# Patient Record
Sex: MALE | Race: WHITE | URBAN - NONMETROPOLITAN AREA
[De-identification: names, ages, dates, MRNs, and addresses within clinical notes are randomized per-mention and may not be internally consistent; named-entity substitution may affect disease eponyms.]

---

## 2022-05-03 ENCOUNTER — HISTORICAL (OUTPATIENT)
Dept: ADMINISTRATIVE | Facility: HOSPITAL | Age: 62
End: 2022-05-03

## 2024-06-07 ENCOUNTER — HOSPITAL ENCOUNTER (INPATIENT)
Facility: HOSPITAL | Age: 64
LOS: 1 days | Discharge: HOME | DRG: 069 | End: 2024-06-08
Attending: SURGERY | Admitting: INTERNAL MEDICINE
Payer: COMMERCIAL

## 2024-06-07 DIAGNOSIS — R53.81 MALAISE: ICD-10-CM

## 2024-06-07 DIAGNOSIS — R07.9 CHEST PAIN: ICD-10-CM

## 2024-06-07 DIAGNOSIS — D69.6 THROMBOCYTOPENIA: ICD-10-CM

## 2024-06-07 DIAGNOSIS — I25.10 CORONARY ARTERY DISEASE INVOLVING NATIVE CORONARY ARTERY OF NATIVE HEART WITHOUT ANGINA PECTORIS: ICD-10-CM

## 2024-06-07 DIAGNOSIS — N39.0 URINARY TRACT INFECTION WITHOUT HEMATURIA, SITE UNSPECIFIED: ICD-10-CM

## 2024-06-07 DIAGNOSIS — N28.9 ACUTE RENAL INSUFFICIENCY: ICD-10-CM

## 2024-06-07 DIAGNOSIS — I63.9 CEREBROVASCULAR ACCIDENT (CVA), UNSPECIFIED MECHANISM: Primary | ICD-10-CM

## 2024-06-07 PROBLEM — Z79.01 CHRONIC ANTICOAGULATION: Status: ACTIVE | Noted: 2024-06-07

## 2024-06-07 PROBLEM — N30.00 ACUTE CYSTITIS WITHOUT HEMATURIA: Status: ACTIVE | Noted: 2024-06-07

## 2024-06-07 PROBLEM — R47.1 DYSARTHRIA: Status: ACTIVE | Noted: 2024-06-07

## 2024-06-07 LAB
ALBUMIN SERPL-MCNC: 4.2 G/DL (ref 3.4–5)
ALBUMIN/GLOB SERPL: 1.3 RATIO
ALP SERPL-CCNC: 167 UNIT/L (ref 50–144)
ALT SERPL-CCNC: 49 UNIT/L (ref 1–45)
AMPHET UR QL SCN: NEGATIVE
ANION GAP SERPL CALC-SCNC: 8 MEQ/L (ref 2–13)
AST SERPL-CCNC: 51 UNIT/L (ref 17–59)
BACTERIA #/AREA URNS AUTO: ABNORMAL /HPF
BARBITURATE SCN PRESENT UR: NEGATIVE
BASOPHILS # BLD AUTO: 0.01 X10(3)/MCL (ref 0.01–0.08)
BASOPHILS NFR BLD AUTO: 0.1 % (ref 0.1–1.2)
BENZODIAZ UR QL SCN: NEGATIVE
BILIRUB SERPL-MCNC: 1.1 MG/DL (ref 0–1)
BILIRUB UR QL STRIP.AUTO: NEGATIVE
BUN SERPL-MCNC: 15 MG/DL (ref 7–20)
CALCIUM SERPL-MCNC: 9.5 MG/DL (ref 8.4–10.2)
CANNABINOIDS UR QL SCN: NEGATIVE
CHLORIDE SERPL-SCNC: 101 MMOL/L (ref 98–110)
CLARITY UR: CLEAR
CO2 SERPL-SCNC: 25 MMOL/L (ref 21–32)
COCAINE UR QL SCN: NEGATIVE
COLOR UR AUTO: YELLOW
CREAT SERPL-MCNC: 1.54 MG/DL (ref 0.66–1.25)
CREAT/UREA NIT SERPL: 10 (ref 12–20)
EOSINOPHIL # BLD AUTO: 0.08 X10(3)/MCL (ref 0.04–0.54)
EOSINOPHIL NFR BLD AUTO: 1.1 % (ref 0.7–7)
ERYTHROCYTE [DISTWIDTH] IN BLOOD BY AUTOMATED COUNT: 12.2 %
ETHANOL BLD-MCNC: <0.01 G/DL
ETHANOL SERPL-MCNC: <10 MG/DL
GFR SERPLBLD CREATININE-BSD FMLA CKD-EPI: 49 ML/MIN/1.73/M2
GLOBULIN SER-MCNC: 3.2 GM/DL (ref 2–3.9)
GLUCOSE SERPL-MCNC: 108 MG/DL (ref 70–115)
GLUCOSE UR QL STRIP: NEGATIVE
HCT VFR BLD AUTO: 41.6 % (ref 36–52)
HGB BLD-MCNC: 14.9 G/DL (ref 13–18)
HGB UR QL STRIP: NEGATIVE
IMM GRANULOCYTES # BLD AUTO: 0.02 X10(3)/MCL (ref 0–0.03)
IMM GRANULOCYTES NFR BLD AUTO: 0.3 % (ref 0–0.5)
KETONES UR QL STRIP: NEGATIVE
LEUKOCYTE ESTERASE UR QL STRIP: ABNORMAL
LYMPHOCYTES # BLD AUTO: 0.86 X10(3)/MCL (ref 1.32–3.57)
LYMPHOCYTES NFR BLD AUTO: 11.6 % (ref 20–55)
MCH RBC QN AUTO: 30.8 PG (ref 27–34)
MCHC RBC AUTO-ENTMCNC: 35.8 G/DL (ref 31–37)
MCV RBC AUTO: 86.1 FL (ref 79–99)
METHADONE UR QL SCN: NEGATIVE
MONOCYTES # BLD AUTO: 0.65 X10(3)/MCL (ref 0.3–0.82)
MONOCYTES NFR BLD AUTO: 8.8 % (ref 4.7–12.5)
NEUTROPHILS # BLD AUTO: 5.79 X10(3)/MCL (ref 1.78–5.38)
NEUTROPHILS NFR BLD AUTO: 78.1 % (ref 37–73)
NITRITE UR QL STRIP: NEGATIVE
OPIATES UR QL SCN: NEGATIVE
PCP UR QL: NEGATIVE
PH UR STRIP: 6 [PH]
PH UR: 6 [PH] (ref 5–8)
PLATELET # BLD AUTO: 123 X10(3)/MCL (ref 140–371)
PMV BLD AUTO: 8.2 FL (ref 9.4–12.4)
POTASSIUM SERPL-SCNC: 4 MMOL/L (ref 3.5–5.1)
PROT SERPL-MCNC: 7.4 GM/DL (ref 6.3–8.2)
PROT UR QL STRIP: NEGATIVE
RBC # BLD AUTO: 4.83 X10(6)/MCL (ref 4–6)
RBC #/AREA URNS AUTO: ABNORMAL /HPF
SODIUM SERPL-SCNC: 134 MMOL/L (ref 136–145)
SP GR UR STRIP.AUTO: 1.01 (ref 1–1.03)
SQUAMOUS #/AREA URNS AUTO: ABNORMAL /HPF
UROBILINOGEN UR STRIP-ACNC: 0.2
WBC # SPEC AUTO: 7.41 X10(3)/MCL (ref 4–11.5)
WBC #/AREA URNS AUTO: ABNORMAL /HPF

## 2024-06-07 PROCEDURE — 99204 OFFICE O/P NEW MOD 45 MIN: CPT | Mod: GT,,, | Performed by: PSYCHIATRY & NEUROLOGY

## 2024-06-07 PROCEDURE — 80307 DRUG TEST PRSMV CHEM ANLYZR: CPT | Performed by: SURGERY

## 2024-06-07 PROCEDURE — 96360 HYDRATION IV INFUSION INIT: CPT

## 2024-06-07 PROCEDURE — 84484 ASSAY OF TROPONIN QUANT: CPT | Performed by: SURGERY

## 2024-06-07 PROCEDURE — 93010 ELECTROCARDIOGRAM REPORT: CPT | Mod: ,,, | Performed by: INTERNAL MEDICINE

## 2024-06-07 PROCEDURE — 93005 ELECTROCARDIOGRAM TRACING: CPT

## 2024-06-07 PROCEDURE — 81015 MICROSCOPIC EXAM OF URINE: CPT | Performed by: SURGERY

## 2024-06-07 PROCEDURE — 85025 COMPLETE CBC W/AUTO DIFF WBC: CPT | Performed by: SURGERY

## 2024-06-07 PROCEDURE — 81003 URINALYSIS AUTO W/O SCOPE: CPT | Performed by: SURGERY

## 2024-06-07 PROCEDURE — 25000003 PHARM REV CODE 250: Performed by: SURGERY

## 2024-06-07 PROCEDURE — 11000001 HC ACUTE MED/SURG PRIVATE ROOM

## 2024-06-07 PROCEDURE — 87086 URINE CULTURE/COLONY COUNT: CPT | Performed by: SURGERY

## 2024-06-07 PROCEDURE — 82077 ASSAY SPEC XCP UR&BREATH IA: CPT | Performed by: SURGERY

## 2024-06-07 PROCEDURE — 80053 COMPREHEN METABOLIC PANEL: CPT | Performed by: SURGERY

## 2024-06-07 PROCEDURE — 99285 EMERGENCY DEPT VISIT HI MDM: CPT | Mod: 25

## 2024-06-07 PROCEDURE — 87077 CULTURE AEROBIC IDENTIFY: CPT | Performed by: SURGERY

## 2024-06-07 RX ORDER — ENOXAPARIN SODIUM 100 MG/ML
1 INJECTION SUBCUTANEOUS EVERY 12 HOURS
Status: DISCONTINUED | OUTPATIENT
Start: 2024-06-07 | End: 2024-06-08

## 2024-06-07 RX ORDER — DEXTROSE, SODIUM CHLORIDE, SODIUM LACTATE, POTASSIUM CHLORIDE, AND CALCIUM CHLORIDE 5; .6; .31; .03; .02 G/100ML; G/100ML; G/100ML; G/100ML; G/100ML
INJECTION, SOLUTION INTRAVENOUS CONTINUOUS
Status: DISCONTINUED | OUTPATIENT
Start: 2024-06-07 | End: 2024-06-08 | Stop reason: HOSPADM

## 2024-06-07 RX ORDER — IBUPROFEN 200 MG
16 TABLET ORAL
Status: DISCONTINUED | OUTPATIENT
Start: 2024-06-07 | End: 2024-06-08 | Stop reason: HOSPADM

## 2024-06-07 RX ORDER — LEVOFLOXACIN 500 MG/1
500 TABLET, FILM COATED ORAL ONCE
Status: COMPLETED | OUTPATIENT
Start: 2024-06-07 | End: 2024-06-07

## 2024-06-07 RX ORDER — GLUCAGON 1 MG
1 KIT INJECTION
Status: DISCONTINUED | OUTPATIENT
Start: 2024-06-07 | End: 2024-06-08 | Stop reason: HOSPADM

## 2024-06-07 RX ORDER — SODIUM CHLORIDE 0.9 % (FLUSH) 0.9 %
10 SYRINGE (ML) INJECTION EVERY 12 HOURS PRN
Status: DISCONTINUED | OUTPATIENT
Start: 2024-06-07 | End: 2024-06-08 | Stop reason: HOSPADM

## 2024-06-07 RX ORDER — SIMETHICONE 80 MG
1 TABLET,CHEWABLE ORAL 4 TIMES DAILY PRN
Status: DISCONTINUED | OUTPATIENT
Start: 2024-06-07 | End: 2024-06-08 | Stop reason: HOSPADM

## 2024-06-07 RX ORDER — ONDANSETRON HYDROCHLORIDE 2 MG/ML
4 INJECTION, SOLUTION INTRAVENOUS EVERY 8 HOURS PRN
Status: DISCONTINUED | OUTPATIENT
Start: 2024-06-07 | End: 2024-06-08 | Stop reason: HOSPADM

## 2024-06-07 RX ORDER — ACETAMINOPHEN 325 MG/1
650 TABLET ORAL EVERY 4 HOURS PRN
Status: DISCONTINUED | OUTPATIENT
Start: 2024-06-07 | End: 2024-06-08 | Stop reason: HOSPADM

## 2024-06-07 RX ORDER — NALOXONE HCL 0.4 MG/ML
0.02 VIAL (ML) INJECTION
Status: DISCONTINUED | OUTPATIENT
Start: 2024-06-07 | End: 2024-06-08 | Stop reason: HOSPADM

## 2024-06-07 RX ORDER — IBUPROFEN 200 MG
24 TABLET ORAL
Status: DISCONTINUED | OUTPATIENT
Start: 2024-06-07 | End: 2024-06-08 | Stop reason: HOSPADM

## 2024-06-07 RX ORDER — SODIUM CHLORIDE 9 MG/ML
INJECTION, SOLUTION INTRAVENOUS CONTINUOUS
Status: DISCONTINUED | OUTPATIENT
Start: 2024-06-07 | End: 2024-06-07

## 2024-06-07 RX ORDER — LEVOFLOXACIN 500 MG/1
500 TABLET, FILM COATED ORAL DAILY
Status: DISCONTINUED | OUTPATIENT
Start: 2024-06-08 | End: 2024-06-08 | Stop reason: HOSPADM

## 2024-06-07 RX ORDER — ASPIRIN 325 MG
325 TABLET ORAL DAILY
Status: DISCONTINUED | OUTPATIENT
Start: 2024-06-08 | End: 2024-06-08 | Stop reason: HOSPADM

## 2024-06-07 RX ADMIN — SODIUM CHLORIDE 1000 ML: 9 INJECTION, SOLUTION INTRAVENOUS at 09:06

## 2024-06-07 RX ADMIN — LEVOFLOXACIN 500 MG: 500 TABLET, FILM COATED ORAL at 09:06

## 2024-06-08 VITALS
RESPIRATION RATE: 18 BRPM | TEMPERATURE: 98 F | OXYGEN SATURATION: 96 % | BODY MASS INDEX: 27.33 KG/M2 | WEIGHT: 184.5 LBS | HEART RATE: 69 BPM | SYSTOLIC BLOOD PRESSURE: 127 MMHG | HEIGHT: 69 IN | DIASTOLIC BLOOD PRESSURE: 77 MMHG

## 2024-06-08 LAB
ANION GAP SERPL CALC-SCNC: 5 MEQ/L (ref 2–13)
BASOPHILS # BLD AUTO: 0.02 X10(3)/MCL (ref 0.01–0.08)
BASOPHILS NFR BLD AUTO: 0.3 % (ref 0.1–1.2)
BUN SERPL-MCNC: 14 MG/DL (ref 7–20)
CALCIUM SERPL-MCNC: 8.7 MG/DL (ref 8.4–10.2)
CHLORIDE SERPL-SCNC: 106 MMOL/L (ref 98–110)
CO2 SERPL-SCNC: 25 MMOL/L (ref 21–32)
CREAT SERPL-MCNC: 1.26 MG/DL (ref 0.66–1.25)
CREAT/UREA NIT SERPL: 11 (ref 12–20)
EOSINOPHIL # BLD AUTO: 0.06 X10(3)/MCL (ref 0.04–0.54)
EOSINOPHIL NFR BLD AUTO: 0.9 % (ref 0.7–7)
ERYTHROCYTE [DISTWIDTH] IN BLOOD BY AUTOMATED COUNT: 12.3 %
GFR SERPLBLD CREATININE-BSD FMLA CKD-EPI: 63 ML/MIN/1.73/M2
GLUCOSE SERPL-MCNC: 123 MG/DL (ref 70–115)
HCT VFR BLD AUTO: 38.1 % (ref 36–52)
HGB BLD-MCNC: 14 G/DL (ref 13–18)
IMM GRANULOCYTES # BLD AUTO: 0.02 X10(3)/MCL (ref 0–0.03)
IMM GRANULOCYTES NFR BLD AUTO: 0.3 % (ref 0–0.5)
LYMPHOCYTES # BLD AUTO: 0.86 X10(3)/MCL (ref 1.32–3.57)
LYMPHOCYTES NFR BLD AUTO: 13.6 % (ref 20–55)
MAGNESIUM SERPL-MCNC: 2.2 MG/DL (ref 1.8–2.4)
MCH RBC QN AUTO: 32.3 PG (ref 27–34)
MCHC RBC AUTO-ENTMCNC: 36.7 G/DL (ref 31–37)
MCV RBC AUTO: 87.8 FL (ref 79–99)
MONOCYTES # BLD AUTO: 0.55 X10(3)/MCL (ref 0.3–0.82)
MONOCYTES NFR BLD AUTO: 8.7 % (ref 4.7–12.5)
NEUTROPHILS # BLD AUTO: 4.82 X10(3)/MCL (ref 1.78–5.38)
NEUTROPHILS NFR BLD AUTO: 76.2 % (ref 37–73)
NRBC BLD AUTO-RTO: 0 %
PLATELET # BLD AUTO: 101 X10(3)/MCL (ref 140–371)
PMV BLD AUTO: 8.4 FL (ref 9.4–12.4)
POTASSIUM SERPL-SCNC: 4 MMOL/L (ref 3.5–5.1)
RBC # BLD AUTO: 4.34 X10(6)/MCL (ref 4–6)
SODIUM SERPL-SCNC: 136 MMOL/L (ref 136–145)
TROPONIN I SERPL-MCNC: <0.012 NG/ML (ref 0–0.03)
TROPONIN I SERPL-MCNC: <0.012 NG/ML (ref 0–0.03)
WBC # SPEC AUTO: 6.33 X10(3)/MCL (ref 4–11.5)

## 2024-06-08 PROCEDURE — 63600175 PHARM REV CODE 636 W HCPCS: Performed by: INTERNAL MEDICINE

## 2024-06-08 PROCEDURE — 84484 ASSAY OF TROPONIN QUANT: CPT | Performed by: SURGERY

## 2024-06-08 PROCEDURE — 85025 COMPLETE CBC W/AUTO DIFF WBC: CPT | Performed by: INTERNAL MEDICINE

## 2024-06-08 PROCEDURE — 83735 ASSAY OF MAGNESIUM: CPT | Performed by: INTERNAL MEDICINE

## 2024-06-08 PROCEDURE — 25000003 PHARM REV CODE 250: Performed by: INTERNAL MEDICINE

## 2024-06-08 PROCEDURE — 36415 COLL VENOUS BLD VENIPUNCTURE: CPT | Performed by: INTERNAL MEDICINE

## 2024-06-08 PROCEDURE — 80048 BASIC METABOLIC PNL TOTAL CA: CPT | Performed by: INTERNAL MEDICINE

## 2024-06-08 PROCEDURE — 25000003 PHARM REV CODE 250: Performed by: SURGERY

## 2024-06-08 RX ORDER — ATORVASTATIN CALCIUM 40 MG/1
40 TABLET, FILM COATED ORAL DAILY
Qty: 90 TABLET | Refills: 3 | Status: SHIPPED | OUTPATIENT
Start: 2024-06-08 | End: 2024-06-28 | Stop reason: SDUPTHER

## 2024-06-08 RX ORDER — LEVOFLOXACIN 500 MG/1
500 TABLET, FILM COATED ORAL DAILY
Qty: 5 TABLET | Refills: 0 | Status: SHIPPED | OUTPATIENT
Start: 2024-06-08 | End: 2024-06-14

## 2024-06-08 RX ADMIN — SODIUM CHLORIDE, SODIUM LACTATE, POTASSIUM CHLORIDE, CALCIUM CHLORIDE AND DEXTROSE MONOHYDRATE: 5; 600; 310; 30; 20 INJECTION, SOLUTION INTRAVENOUS at 10:06

## 2024-06-08 RX ADMIN — ENOXAPARIN SODIUM 80 MG: 80 INJECTION SUBCUTANEOUS at 08:06

## 2024-06-08 RX ADMIN — SODIUM CHLORIDE, SODIUM LACTATE, POTASSIUM CHLORIDE, CALCIUM CHLORIDE AND DEXTROSE MONOHYDRATE: 5; 600; 310; 30; 20 INJECTION, SOLUTION INTRAVENOUS at 12:06

## 2024-06-08 RX ADMIN — ASPIRIN 325 MG ORAL TABLET 325 MG: 325 PILL ORAL at 08:06

## 2024-06-08 RX ADMIN — LEVOFLOXACIN 500 MG: 500 TABLET, FILM COATED ORAL at 08:06

## 2024-06-08 RX ADMIN — ENOXAPARIN SODIUM 80 MG: 80 INJECTION SUBCUTANEOUS at 12:06

## 2024-06-08 NOTE — HPI
===========================================  Chief complaint: Dysarthria, difficulty walking  Allergies: Sulfa  CODE STATUS: Full Code     History:  Patient is a 64-year-old man with history of coronary artery disease status post CABG, chronic anticoagulation, hypercoagulable blood disorder, hypertension, daily alcohol use without abuse who had arrived at the emergency room with complaints of dysarthria and difficulty walking.       For the past few days patient has been having difficulty with a bronchitis and had recently been placed on medication.  He also admits to having recent sick contacts in which he was going to the  of his ex-wife.     Today prior to taking the promethazine medication patient was starting to have difficulty walking and stepping down from the sidewalk.  Patient was also having issues with dysarthria.  This was according to the niece.  Patient then had taken the promethazine to help with issues of her cough which led him to have worsening dysarthria and visual hallucinations as he was talking to his dad ex-wife.  This had been witnessed by family.     While patient does drink 2x32 ounce cans of beer daily patient has not had alcohol for the past 3 days due to not feeling well from the bronchitis.  Patient and family deny any history of withdrawals.        Patient has a hypercoagulable blood disorder in which he has been on anticoagulation/Coumadin chronically.  This has been proven in the past in which patient has had multiple pulmonary emboli and is to be on anticoagulation for life.  Patient is supposed to be taking Coumadin but has admitted that he has not been taking it for a while.       During interview patient continued to have issues of persistent dysarthria but there was no evidence of facial asymmetry, no evidence of expressive or receptive aphasia.  Patient was moving all limbs.       Initial vital signs in the emergency room 98.4 °F, 96 bpm, 20 respirations a minute,  133/85 mmHg, 95% on room air.  Patient was 84.8 kg.     Review of hematology is unremarkable and noncontributory.  Sodium was 134, potassium 4.0, chloride 101, carbon dioxide 25, BUNs/creatinine 15/1.54 with a glucose of 108.  Calcium is 9.5.  Alkaline phosphatase 167.  Total protein/albumin 7.4/4.2.  T. bili of 1.1.  AST/ALT 51/49.  Troponin 1 less than 0.012.  Alcohol level was within normal limits.  Urine drug screen was negative.  Urinary analysis shows patient to have evidence of urinary tract infection.       CT of the head without contrast reveals no acute intracranial abnormality.     Patient was admitted to the hospital for concerns of CVA.  NIH score in the emergency room was 1.

## 2024-06-08 NOTE — PLAN OF CARE
Problem: Adult Inpatient Plan of Care  Goal: Plan of Care Review  Outcome: Met  Goal: Patient-Specific Goal (Individualized)  Outcome: Met  Goal: Absence of Hospital-Acquired Illness or Injury  Outcome: Met  Goal: Optimal Comfort and Wellbeing  Outcome: Met  Goal: Readiness for Transition of Care  Outcome: Met     Problem: Fall Injury Risk  Goal: Absence of Fall and Fall-Related Injury  Outcome: Met     Problem: Fatigue  Goal: Improved Activity Tolerance  Outcome: Met     Problem: Infection  Goal: Absence of Infection Signs and Symptoms  Outcome: Met

## 2024-06-08 NOTE — ED PROVIDER NOTES
Encounter Date: 6/7/2024       History     Chief Complaint   Patient presents with    Fatigue     PT to ER A per KENNY, medic states PT is unsteady on feet and sleepy. PT was dx with bronchitis this am and been taking his promethazine.      63 YO WM ARRIVING VIA EMS W/ C/O UNSTEADY GAIT & DYSARTHRIA FOLLOWING PROMETHAZINE & UNSPECIFIED COUGH SYRUP PROVIDED AT URGENT CARE CENTER THIS AM FOR COUGH NOS.  NO CP.  +DAUGHTER MARKEDLY CONCERNED.  PATIENT DENIES CP/CHANGE VISUAL ACUITY OR AUDITORY ACUITY.  NO TINNITUS.  NO MOTOR OR SENSORY DEFICITS.  +Hx/o CABG NOS.        Review of patient's allergies indicates:   Allergen Reactions    Sulfa (sulfonamide antibiotics)      Past Medical History:   Diagnosis Date    History of open heart surgery      History reviewed. No pertinent surgical history.  No family history on file.  Social History     Substance Use Topics    Alcohol use: Yes     Review of Systems   Reason unable to perform ROS: DROWSY.   All other systems reviewed and are negative.      Physical Exam     Initial Vitals [06/07/24 1902]   BP Pulse Resp Temp SpO2   133/85 96 20 98.4 °F (36.9 °C) 95 %      MAP       --         Physical Exam    Nursing note and vitals reviewed.  Constitutional: He appears well-developed and well-nourished.   DISHEVELED     HENT:   Head: Normocephalic and atraumatic.   Right Ear: External ear normal.   Left Ear: External ear normal.   Nose: Nose normal.   Mouth/Throat: Oropharynx is clear and moist.   Eyes: Conjunctivae and EOM are normal. Pupils are equal, round, and reactive to light. Right eye exhibits no discharge. Left eye exhibits no discharge. No scleral icterus.   Neck: Neck supple. No thyromegaly present. No tracheal deviation present. No JVD present.   Normal range of motion.  Cardiovascular:  Normal rate, regular rhythm, normal heart sounds and intact distal pulses.     Exam reveals no gallop and no friction rub.       No murmur heard.  HEALED MIDLINE STERNOTOMY SCARE    Pulmonary/Chest: Breath sounds normal. No stridor. No respiratory distress. He has no wheezes. He has no rhonchi. He has no rales.   Abdominal: Abdomen is soft. Bowel sounds are normal. He exhibits no distension and no mass. There is no abdominal tenderness. There is no rebound and no guarding.   Musculoskeletal:         General: No tenderness or edema. Normal range of motion.      Cervical back: Normal range of motion and neck supple.     Lymphadenopathy:     He has no cervical adenopathy.   Neurological: He is alert and oriented to person, place, and time. He has normal strength. No cranial nerve deficit or sensory deficit.   MILD DYSARTHRIA  NO TREMOR/NO NYSTAGMUS  NO ATAXIA  ASAD  GLOSSUS MIDLINE  B SYMMETRIC FACIES  STRENGTH 5/5 GLOBALLY   Skin: Skin is warm and dry. Capillary refill takes less than 2 seconds.   Psychiatric: He has a normal mood and affect. His behavior is normal. Judgment and thought content normal.   ALTHOUGH SOMNOLENT BUT NOT CLOUDED SENSORIUM         ED Course   Procedures  Labs Reviewed   COMPREHENSIVE METABOLIC PANEL - Abnormal; Notable for the following components:       Result Value    Sodium 134 (*)     Creatinine 1.54 (*)     Bilirubin Total 1.1 (*)      (*)     ALT 49 (*)     BUN/Creatinine Ratio 10 (*)     All other components within normal limits   URINALYSIS, REFLEX TO URINE CULTURE - Abnormal; Notable for the following components:    Leukocyte Esterase, UA Small (*)     All other components within normal limits    Narrative:      URINE STABILITY IS 2 HOURS AT ROOM TEMP OR    SIX HOURS REFRIGERATED. PERFORMING TESTING ON    SPECIMENS GREATER THAN THIS AGE MAY AFFECT THE    FOLLOWING TESTS:    PH          SPECIFIC GRAVITY           BLOOD    CLARITY     BILIRUBIN               UROBILINOGEN   CBC WITH DIFFERENTIAL - Abnormal; Notable for the following components:    Platelet 123 (*)     MPV 8.2 (*)     Neut % 78.1 (*)     Lymph % 11.6 (*)     Lymph # 0.86 (*)     Neut # 5.79  (*)     All other components within normal limits   URINALYSIS, MICROSCOPIC - Abnormal; Notable for the following components:    Bacteria, UA Many (*)     WBC, UA 6-10 (*)     All other components within normal limits   ALCOHOL,MEDICAL (ETHANOL) - Normal   DRUG SCREEN, URINE (BEAKER) - Normal    Narrative:     Cut off concentrations:    Amphetamines - 1000 ng/ml  Barbiturates - 200 ng/ml  Benzodiazepine - 200 ng/ml  Cannabinoids (THC) - 50 ng/ml  Cocaine - 300 ng/ml  Fentanyl - 1.0 ng/ml  MDMA - 500 ng/ml  Opiates - 300 ng/ml   Phencyclidine (PCP) - 25 ng/ml  Methadone - 300 ng/ml      False negatives may result form substances such as bleach added to urine.  False positives may result for the presence of a substance with similar chemical structure to the drug or its metabolite.    This test provides only a PRELIMINARY analytical test result. A more specific alternate chemical method must be used in order to obtain a confirmed analytical result. Gas chromatography/mass spectrometry (GC/MS) is the preferred confirmatory method. Other chemical confirmation methods are available. Clinical consideration and professional judgement should be applied to any drug of abuse test result, particularly when preliminary positive results are used.    Positive results will be confirmed only at the physicians request. Unconfirmed screening results are to be used only for medical purposes (treatment).          CULTURE, URINE   CBC W/ AUTO DIFFERENTIAL    Narrative:     The following orders were created for panel order CBC Auto Differential.  Procedure                               Abnormality         Status                     ---------                               -----------         ------                     CBC with Differential[[id number]]       Abnormal            Final result                 Please view results for these tests on the individual orders.     EKG Readings: (Independently Interpreted)   Initial Reading: No STEMI.  Previous EKG Date: UNKNOWN. Ectopy: No Ectopy. Conduction: RBBB and LAFB.   SINUS FOCUS  RIGHT BUNDLE BRANCH BLOCK W/ LEFT ANTERIOR FASCICULAR BLOCK  NONSPECIFIC CHANGES  NO PRIOR EKG       Imaging Results              CT Head Without Contrast (Final result)  Result time 06/07/24 19:58:01      Final result by Vijay Ulloa MD (06/07/24 19:58:01)                   Impression:      No acute intracranial abnormality.      Electronically signed by: Vijay Ulloa MD  Date:    06/07/2024  Time:    19:58               Narrative:    EXAMINATION:  CT HEAD WITHOUT CONTRAST    CLINICAL HISTORY:  DYSARTHRIA;    TECHNIQUE:  Axial images of the head were obtained without IV contrast administration.  Coronal and sagittal reconstructions were provided.  Three dimensional and MIP images were obtained and evaluated.  Total DLP was 1130.9 mGy-cm. Dose lowering technique and automated exposure control were utilized for this exam.    COMPARISON:  None    FINDINGS:  There is normal brain formation.  There is normal gray-white matter differentiation.  There is no hemorrhage, hydrocephalus, or midline shift.  There is no cytotoxic or vasogenic edema.  There is no intra or extra-axial fluid collection.  There is no herniation.    The calvarium is intact.  There is no fracture.  The bilateral orbits are normal.  The paranasal sinuses are free of disease.                                       Medications   sodium chloride 0.9% bolus 1,000 mL 1,000 mL (1,000 mLs Intravenous New Bag 6/7/24 2117)   aspirin tablet 325 mg (has no administration in time range)   levoFLOXacin tablet 500 mg (500 mg Oral Given 6/7/24 2130)     Medical Decision Making             ED Course as of 06/07/24 2159 Fri Jun 07, 2024 2028 Creatinine(!): 1.54 [WV]   2028 Sodium(!): 134 [WV]   2029 BILIRUBIN TOTAL(!): 1.1 [WV]   2029 ALP(!): 167 [WV]   2029 ALT(!): 49 [WV]   2029 WBC, UA(!): 6-10 [WV]   2029 Bacteria, UA(!): Many [WV]   2029 Leukocyte Esterase, UA(!): Small  [WV]   2113 DR BENAVIDES, NEUROLOGY, RECOMMENDS, OBSERVATION W/ MRI BRAIN IF Sx DON'T CLEAR BY AM [WV]   2155 DR GASPAR ACCEPTED FOR ADMISSION   [WV]      ED Course User Index  [WV] Ok Hutchison MD                           Clinical Impression:  Final diagnoses:  [R53.81] Malaise  [I63.9] Cerebrovascular accident (CVA), unspecified mechanism (Primary)  [N39.0] Urinary tract infection without hematuria, site unspecified  [N28.9] Acute renal insufficiency          ED Disposition Condition    Admit Stable                Ok Hutchison MD  06/07/24 3137

## 2024-06-08 NOTE — ED NOTES
Francis Berman  : 1960  MRN: [id number]  ConnectID: [id number]  REASON:  Admit: non-ICU  ACUITY:  10 Minutes  SUBMITTED:  2024 21:48 CDT

## 2024-06-08 NOTE — HOSPITAL COURSE
06/08/2024 pt states symptoms have resolved and he is back to his normal self.  He cannot explain why he is not taking his blood thinner, says he just didn't think he needed to take them.  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  His symptoms are completely resolved and he denies any current weakness or dysphagia.  He tolerated a regular diet today.  PT needs to be on DOAC or coumadin and should not be operating a vehicle until he gets back on his meds and is  compliant. This was discussed with the pt at length.    DISCHARGE SUMMARY: pt states he does not have aPCP and just quit taking his coumadin, discussed DOAC and will start him on some eliquis given high risk for thrombosis.  Explained risk of blood thinners, pt understands and says he did well with coumadin but [place] and had trouble making appoitnments for blood work.  Referral to [facility] clinic to establish PCP in [place] or [facility].

## 2024-06-08 NOTE — SUBJECTIVE & OBJECTIVE
HPI:  64 y.o. male Referring Facility: [facility] Referring Provider: OK CALERO LKN: 6/7/2024 @ 12:00 pm Symptoms:ams, pt took promethazine without measuring, unsteady on feet Has the referring seen the patient ( yes or no): yes      Images personally reviewed and interpreted:  Study: Head CT  Study Interpretation: no acute intra cranial process    Current Facility-Administered Medications:     levoFLOXacin tablet 500 mg, 500 mg, Oral, Once, Ok Calero MD    sodium chloride 0.9% bolus 1,000 mL 1,000 mL, 1,000 mL, Intravenous, Once, Ok Calero MD  No current outpatient medications on file.         Assessment and plan:  Ddx: drug over dose vs stroke vs peripheral causes    - Admit for observation  - aspirin 81 mg  - If no improvement in symptoms, recommend admission for MRI brain/ MRA head with no contrast  Lytics recommendation: Thrombolytic therapy not recommended due to Outside of treatment window   Thrombectomy recommendation: Awaiting CTA results from [facility] for determination   Placement recommendation: pending further studies  admit to inpatient

## 2024-06-08 NOTE — DISCHARGE SUMMARY
[facility]/Surg  [facility] Medicine  Discharge Summary      Patient Name: Francis Berman  MRN: [id number]  [facility]: [id number]  Patient Class: IP- Inpatient  Admission Date: 2024  Hospital Length of Stay: 1 days  Discharge Date and Time:  2024 2:39 PM  Attending Physician: Vu Adams MD   Discharging Provider: Mela Landa MD  Primary Care Provider: No primary care provider on file.    Primary Care Team: Networked reference to record PCT     HPI:         ===========================================  Chief complaint: Dysarthria, difficulty walking  Allergies: Sulfa  CODE STATUS: Full Code     History:  Patient is a 64-year-old man with history of coronary artery disease status post CABG, chronic anticoagulation, hypercoagulable blood disorder, hypertension, daily alcohol use without abuse who had arrived at the emergency room with complaints of dysarthria and difficulty walking.       For the past few days patient has been having difficulty with a bronchitis and had recently been placed on medication.  He also admits to having recent sick contacts in which he was going to the  of his ex-wife.     Today prior to taking the promethazine medication patient was starting to have difficulty walking and stepping down from the sidewalk.  Patient was also having issues with dysarthria.  This was according to the niece.  Patient then had taken the promethazine to help with issues of her cough which led him to have worsening dysarthria and visual hallucinations as he was talking to his dad ex-wife.  This had been witnessed by family.     While patient does drink 2x32 ounce cans of beer daily patient has not had alcohol for the past 3 days due to not feeling well from the bronchitis.  Patient and family deny any history of withdrawals.        Patient has a hypercoagulable blood disorder in which he has been on anticoagulation/Coumadin chronically.  This has been proven in the past in which  patient has had multiple pulmonary emboli and is to be on anticoagulation for life.  Patient is supposed to be taking Coumadin but has admitted that he has not been taking it for a while.       During interview patient continued to have issues of persistent dysarthria but there was no evidence of facial asymmetry, no evidence of expressive or receptive aphasia.  Patient was moving all limbs.       Initial vital signs in the emergency room 98.4 °F, 96 bpm, 20 respirations a minute, 133/85 mmHg, 95% on room air.  Patient was 84.8 kg.     Review of hematology is unremarkable and noncontributory.  Sodium was 134, potassium 4.0, chloride 101, carbon dioxide 25, BUNs/creatinine 15/1.54 with a glucose of 108.  Calcium is 9.5.  Alkaline phosphatase 167.  Total protein/albumin 7.4/4.2.  T. bili of 1.1.  AST/ALT 51/49.  Troponin 1 less than 0.012.  Alcohol level was within normal limits.  Urine drug screen was negative.  Urinary analysis shows patient to have evidence of urinary tract infection.       CT of the head without contrast reveals no acute intracranial abnormality.     Patient was admitted to the hospital for concerns of CVA.  NIH score in the emergency room was 1.    * No surgery found *      Hospital Course:   06/08/2024 pt states symptoms have resolved and he is back to his normal self.  He cannot explain why he is not taking his blood thinner, says he just didn't think he needed to take them.  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  His symptoms are completely resolved and he denies any current weakness or dysphagia.  He tolerated a regular diet today.  PT needs to be on DOAC or coumadin and should not be operating a vehicle until he gets back on his meds and is  compliant. This was discussed with the pt at length.    DISCHARGE SUMMARY: pt states he does not have aPCP and just quit taking his coumadin,  discussed DOAC and will start him on some eliquis given high risk for thrombosis.  Explained risk of blood thinners, pt understands and says he did well with coumadin but [place] and had trouble making appoitnments for blood work.  Referral to [facility] clinic to establish PCP in [place] or [facility].     Goals of Care Treatment Preferences:  Code Status: Full Code      Consults:   Consults (From admission, onward)          Status Ordering Provider     Inpatient consult to neurology  Once        Provider:  (Not yet assigned)    Acknowledged SILVIA CALERO     Inpatient consult to Hospitalist  Once        Provider:  (Not yet assigned)    Acknowledged SILVIA CALERO            No new Assessment & Plan notes have been filed under this hospital service since the last note was generated.  Service: Hospital Medicine    Final Active Diagnoses:    Diagnosis Date Noted POA    PRINCIPAL PROBLEM:  Acute cystitis without hematuria [N30.00] 06/07/2024 Yes    Dysarthria [R47.1] 06/07/2024 Yes    Chronic anticoagulation [Z79.01] 06/07/2024 Not Applicable      Problems Resolved During this Admission:       Discharged Condition: good    Disposition: Home or Self Care    Follow Up:   Follow-up Information       Rosteet, Antonia A., NP Follow up.    Specialty: Family Medicine  Why: needs PCP  Contact information:  [address] [facility] [id number] [phone]                           Patient Instructions:      Activity as tolerated       Significant Diagnostic Studies: Labs: CMP   Recent Labs   Lab 06/07/24 1919 06/08/24  0303   * 136   K 4.0 4.0    106   CO2 25 25   BUN 15 14   CREATININE 1.54* 1.26*   CALCIUM 9.5 8.7   ALBUMIN 4.2  --    BILITOT 1.1*  --    ALKPHOS 167*  --    AST 51  --    ALT 49*  --     and CBC   Recent Labs   Lab 06/07/24 1919 06/08/24  0303   WBC 7.41 6.33   HGB 14.9 14.0   HCT 41.6 38.1   * 101*       Pending Diagnostic Studies:       None            Medications:  Reconciled Home Medications:      Medication List        START taking these medications      apixaban 5 mg Tab  Commonly known as: ELIQUIS  Take 1 tablet (5 mg total) by mouth 2 (two) times daily.     atorvastatin 40 MG tablet  Commonly known as: LIPITOR  Take 1 tablet (40 mg total) by mouth once daily.     levoFLOXacin 500 MG tablet  Commonly known as: LEVAQUIN  Take 1 tablet (500 mg total) by mouth once daily. for 5 days              Indwelling Lines/Drains at time of discharge:   Lines/Drains/Airways       None                   Time spent on the discharge of patient: 36 minutes  Physical Exam  Vitals and nursing note reviewed.   Constitutional:       General: He is not in acute distress.     Appearance: Normal appearance. He is normal weight. He is not ill-appearing.   HENT:      Head: Normocephalic and atraumatic.   Cardiovascular:      Rate and Rhythm: Normal rate and regular rhythm.      Pulses: Normal pulses.      Heart sounds: Normal heart sounds.   Pulmonary:      Effort: Pulmonary effort is normal.      Breath sounds: Normal breath sounds.   Abdominal:      General: Abdomen is flat. Bowel sounds are normal.      Palpations: Abdomen is soft.   Skin:     General: Skin is warm and dry.      Findings: No erythema or rash.   Neurological:      Mental Status: He is alert.   Psychiatric:      Comments: I had a face to face encounter with this patient prior to discharging       Patient Screened for food insecurity, housing instability, transportation needs, utility difficulties, and interpersonal safety.  No needs identified         Mela Landa MD  Department of Hospital Medicine  [facility]/Surg

## 2024-06-08 NOTE — H&P
Active Problem List:  - Acute Dysarthria  - Ambulation Difficulty  - Adverse Medication Reaction   - Hypercoagulable blood disorder  - CAD/htn/hld   - Chronic Anticoagulation  - Daily alcohol use w/o dependence  - Medical Non-adherence to Treatment    Medical Decision Making 06/07/2024:  -Patient is being admitted to the hospital for issues of persistent dysarthria and ambulation difficulty.  Initially it was thought secondary to promethazine.  However, patient was having symptoms prior to taking promethazine.  After he took the promethazine his symptoms have gotten progressively worse including visual hallucinations of seeing his dead ex-wife.  -There is concern given that patient is being medically nonadherent to treatment by not taking his Coumadin on a regular basis and  having an underlying hypercoagulation disorder that patient has developed a tia vs CVA.  CT of the head without contrast was negative for acute intracranial abnormality.  Neurology recommended that if patient had persistent dysarthria into the morning that the MRI of the brain should be obtained. Physical Therapy consult, speech therapy consult.    -In regards to thrombolytics patient was arrived outside the window for any thrombolytic use.    Diet: NPO, until speech therapy clears  DVT Prophy: Patient is post to be on Coumadin, we will use Lovenox 1 mg/kg subcutaneous twice daily.  No evidence of a large infarction or head bleed  Disposition: Patient is likely to be hospitalized greater than 48 hours but less than 96 hours    This encounter was completed via telemedicine (audio/video) with nursing at bedside to assist with clinical exam. SOC Audio/Visual Equipment is using HIPAA compliant web platform.    Participants on call: Bedside RN, patient, physician on-call, son and daughter    Patient Location: [place], [place]  Provider Location: [place]  Physician on call: Rubio Marley MD  Seen in emergency room awaiting bed placement:  Yes  Status of patient: Inpatient    Patient aware of remote access and use of Telemedicine and agrees to continue with care.       Rubio Marley MD  Internal Medicine Hospitalist  Date of service: 2024    ===========================================  Chief complaint: Dysarthria, difficulty walking  Allergies: Sulfa  CODE STATUS: Full Code    History:  Patient is a 64-year-old man with history of coronary artery disease status post CABG, chronic anticoagulation, hypercoagulable blood disorder, hypertension, daily alcohol use without abuse who had arrived at the emergency room with complaints of dysarthria and difficulty walking.      For the past few days patient has been having difficulty with a bronchitis and had recently been placed on medication.  He also admits to having recent sick contacts in which he was going to the  of his ex-wife.    Today prior to taking the promethazine medication patient was starting to have difficulty walking and stepping down from the sidewalk.  Patient was also having issues with dysarthria.  This was according to the niece.  Patient then had taken the promethazine to help with issues of her cough which led him to have worsening dysarthria and visual hallucinations as he was talking to his dad ex-wife.  This had been witnessed by family.    While patient does drink 2x32 ounce cans of beer daily patient has not had alcohol for the past 3 days due to not feeling well from the bronchitis.  Patient and family deny any history of withdrawals.       Patient has a hypercoagulable blood disorder in which he has been on anticoagulation/Coumadin chronically.  This has been proven in the past in which patient has had multiple pulmonary emboli and is to be on anticoagulation for life.  Patient is supposed to be taking Coumadin but has admitted that he has not been taking it for a while.      During interview patient continued to have issues of persistent dysarthria but there was no  "evidence of facial asymmetry, no evidence of expressive or receptive aphasia.  Patient was moving all limbs.      Initial vital signs in the emergency room 98.4 °F, 96 bpm, 20 respirations a minute, 133/85 mmHg, 95% on room air.  Patient was 84.8 kg.    Review of hematology is unremarkable and noncontributory.  Sodium was 134, potassium 4.0, chloride 101, carbon dioxide 25, BUNs/creatinine 15/1.54 with a glucose of 108.  Calcium is 9.5.  Alkaline phosphatase 167.  Total protein/albumin 7.4/4.2.  T. bili of 1.1.  AST/ALT 51/49.  Troponin 1 less than 0.012.  Alcohol level was within normal limits.  Urine drug screen was negative.  Urinary analysis shows patient to have evidence of urinary tract infection.      CT of the head without contrast reveals no acute intracranial abnormality.    Patient was admitted to the hospital for concerns of CVA.  NIH score in the emergency room was 1.    ===========================================  Physical Exam:   (Clinical exam was done via telemedicine with nursing at bedside to assist with clinical exam, some portions of clinical exam from emergency room provider/nursing was used for reference)    /84 (BP Location: Right arm, Patient Position: Lying)   Pulse 87   Temp 97.8 °F (36.6 °C)   Resp 18   Ht 5' 9" (1.753 m)   Wt 83.7 kg (184 lb 8.4 oz)   SpO2 95%   BMI 27.25 kg/m²     General: Mild distress, looks uncomfortable  HEENT: NCAT, EOMI, Moist Mucous Membranes  CV: S1S2 w/ RRR, (-) MRC, peripheral pulses intact and symmetrical  Resp: CTA w/o wrr symmetrical chest rise  GI: Snt, nd w/ bs  Musculoskeletal: MAL, (-) pedal edema  Skin: No obvious rash or lesion, normal skin color, appropriate skin turgor, dry  Neuro: No acute/new focal/gross neurological deficits appreciated, no facial asymmetry or weakness noted during interview/exam, patient had persistent dysarthria throughout the interview but no evidence of expressive or receptive aphasia  Psych: Alert and oriented " x3, appropriate mood and affect    Labs/imaging/medications/vitals/relevant electronic medical records have personally been reviewed by me    Patient screened for food insecurity, housing instability, transportation needs, utility difficulties, and  interpersonal safety.    No current facility-administered medications on file prior to encounter.     No current outpatient medications on file prior to encounter.     Past Medical History:   Diagnosis Date    History of open heart surgery      History reviewed. No pertinent surgical history.  Social History     Socioeconomic History    Marital status:    Substance and Sexual Activity    Alcohol use: Yes     No family history on file.

## 2024-06-08 NOTE — SUBJECTIVE & OBJECTIVE
Interval History:      Review of Systems   Constitutional:  Negative for appetite change, fatigue and fever.   Respiratory:  Negative for cough, shortness of breath and wheezing.    Cardiovascular:  Negative for chest pain and leg swelling.   Gastrointestinal:  Negative for abdominal distention, abdominal pain, constipation, diarrhea, nausea and vomiting.   Skin:  Negative for color change, pallor, rash and wound.   Neurological:  Negative for tremors, syncope and headaches.   Psychiatric/Behavioral:  Negative for agitation and behavioral problems.      Objective:     Vital Signs (Most Recent):  Temp: 98.1 °F (36.7 °C) (06/08/24 1056)  Pulse: 69 (06/08/24 1056)  Resp: 18 (06/08/24 1056)  BP: 127/77 (06/08/24 1056)  SpO2: 96 % (06/08/24 1056) Vital Signs (24h Range):  Temp:  [97.8 °F (36.6 °C)-99 °F (37.2 °C)] 98.1 °F (36.7 °C)  Pulse:  [] 69  Resp:  [16-29] 18  SpO2:  [92 %-97 %] 96 %  BP: (104-133)/(73-92) 127/77     Weight: 83.7 kg (184 lb 8.4 oz)  Body mass index is 27.25 kg/m².    Intake/Output Summary (Last 24 hours) at 6/8/2024 1419  Last data filed at 6/8/2024 1200  Gross per 24 hour   Intake 1240 ml   Output --   Net 1240 ml         Physical Exam  Vitals and nursing note reviewed. Exam conducted with a chaperone present.   Constitutional:       General: He is not in acute distress.     Appearance: Normal appearance. He is obese. He is not ill-appearing.   HENT:      Head: Normocephalic and atraumatic.      Nose: Nose normal.   Eyes:      General: No scleral icterus.     Conjunctiva/sclera: Conjunctivae normal.   Cardiovascular:      Rate and Rhythm: Normal rate and regular rhythm.      Pulses: Normal pulses.      Heart sounds: Normal heart sounds.   Pulmonary:      Effort: Pulmonary effort is normal.      Breath sounds: Normal breath sounds.   Abdominal:      General: Abdomen is flat. Bowel sounds are normal.      Palpations: Abdomen is soft.   Skin:     General: Skin is warm and dry.      Findings: No  erythema or rash.   Neurological:      General: No focal deficit present.      Mental Status: He is alert and oriented to person, place, and time.   Psychiatric:         Mood and Affect: Mood normal.         Behavior: Behavior normal.         Thought Content: Thought content normal.             Significant Labs: All pertinent labs within the past 24 hours have been reviewed.  CBC:   Recent Labs   Lab 06/07/24 1919 06/08/24  0303   WBC 7.41 6.33   HGB 14.9 14.0   HCT 41.6 38.1   * 101*     CMP:   Recent Labs   Lab 06/07/24 1919 06/08/24  0303   * 136   K 4.0 4.0    106   CO2 25 25   BUN 15 14   CREATININE 1.54* 1.26*   CALCIUM 9.5 8.7   ALBUMIN 4.2  --    BILITOT 1.1*  --    ALKPHOS 167*  --    AST 51  --    ALT 49*  --        Significant Imaging: I have reviewed all pertinent imaging results/findings within the past 24 hours.

## 2024-06-08 NOTE — TELEMEDICINE CONSULT
"[facility]  Vascular Neurology  Comprehensive Stroke Center  TeleVascular Neurology Acute Consultation Note        Consult Information  Consults    Consulting Provider: SILVIA CALERO   Current Providers  No providers found    Patient Location:  [facility] EMERGENCY DEPARTMENT Emergency Department    Spoke hospital nurse at bedside with patient assisting consultant.  Patient information was obtained from patient.       Stroke Documentation  Acute Stroke Times   Last Known Normal Date: 06/07/24  Last Known Normal Time: 1200  Stroke Team Called Date: 06/07/24  Stroke Team Called Time: 2052  Stroke Team Arrival Date: 06/07/24  Stroke Team Arrival Time: 2052  Thrombolytic Therapy Recommended: No    NIH Scale:  1a. Level of Consciousness: 0-->Alert, keenly responsive  1b. LOC Questions: 0-->Answers both questions correctly  1c. LOC Commands: 0-->Performs both tasks correctly  2. Best Gaze: 0-->Normal  3. Visual: 0-->No visual loss  4. Facial Palsy: 0-->Normal symmetrical movements  5a. Motor Arm, Left: 0-->No drift, limb holds 90 (or 45) degrees for full 10 secs  5b. Motor Arm, Right: 0-->No drift, limb holds 90 (or 45) degrees for full 10 secs  6a. Motor Leg, Left: 0-->No drift, leg holds 30 degree position for full 5 secs  6b. Motor Leg, Right: 0-->No drift, leg holds 30 degree position for full 5 secs  7. Limb Ataxia: 0-->Absent  8. Sensory: 0-->Normal, no sensory loss  9. Best Language: 0-->No aphasia, normal  10. Dysarthria: 0-->Normal  11. Extinction and Inattention (formerly Neglect): 0-->No abnormality  Total (NIH Stroke Scale): 0      Modified [place]:    [place] Coma Scale:     ABCD2 Score:    [id number] Score:    HAS -BLED Score:    ICH Score:    Hunt & Montaño Classification:      Blood pressure 133/85, pulse 96, temperature 98.4 °F (36.9 °C), temperature source Oral, resp. rate 20, height 5' 6" (1.676 m), weight 84.8 kg (187 lb), SpO2 95%.    Van Negative  In your opinion, this was a: " Tier 1     Medical Decision Making  HPI:  64 y.o. male Referring Facility: [facility] Referring Provider: OK CALERO LKN: 6/7/2024 @ 12:00 pm Symptoms:ams, pt took promethazine without measuring, unsteady on feet Has the referring seen the patient ( yes or no): yes      Images personally reviewed and interpreted:  Study: Head CT  Study Interpretation: no acute intra cranial process    Current Facility-Administered Medications:     levoFLOXacin tablet 500 mg, 500 mg, Oral, Once, Ok Calero MD    sodium chloride 0.9% bolus 1,000 mL 1,000 mL, 1,000 mL, Intravenous, Once, Ok Calero MD  No current outpatient medications on file.         Assessment and plan:  Ddx: drug over dose vs stroke vs peripheral causes    - Admit for observation  - aspirin 81 mg  - If no improvement in symptoms, recommend admission for MRI brain/ MRA head with no contrast  Lytics recommendation: Thrombolytic therapy not recommended due to Outside of treatment window   Thrombectomy recommendation: Awaiting CTA results from Spoke for determination   Placement recommendation: pending further studies  admit to inpatient               ROS  Physical Exam  Past Medical History:   Diagnosis Date    History of open heart surgery      History reviewed. No pertinent surgical history.  No family history on file.    Diagnoses  Stroke like symptoms    Gina Allan MD      Emergent/Acute neurological consultation requested by spoke provider due to critical concerns for possible cerebrovascular event that could result in permanent loss of neurologic/bodily function, severe disability or death of this patient.  Immediate/timely evaluation by a highly prepared expert is paramount for optimal outcomes  High risk for neurological deterioration if not properly diagnosed  High risk for neurological deterioration if not treated promplty/as soon as possible  Complex diagnostic evaluation may be required (advanced  imaging)  High risk treatment options (thrombolytics and/or thrombectomy)    Patient care was coordinated with spoke provider, including but not limted to    Discussing likely diagnosis/etiology of symptoms  Making recommendations for further diagnostic studies  Making recommendations for intravenous thrombolytics or other advanced therapies  Making recommendations for disposition (admission/transfer for higher level of care)

## 2024-06-10 LAB
BACTERIA UR CULT: ABNORMAL
OHS QRS DURATION: 130 MS
OHS QTC CALCULATION: 497 MS

## 2024-06-14 ENCOUNTER — TELEPHONE (OUTPATIENT)
Dept: FAMILY MEDICINE | Facility: CLINIC | Age: 64
End: 2024-06-14

## 2024-06-14 ENCOUNTER — OFFICE VISIT (OUTPATIENT)
Dept: FAMILY MEDICINE | Facility: CLINIC | Age: 64
End: 2024-06-14
Payer: COMMERCIAL

## 2024-06-14 VITALS
SYSTOLIC BLOOD PRESSURE: 118 MMHG | HEART RATE: 80 BPM | WEIGHT: 185 LBS | TEMPERATURE: 98 F | BODY MASS INDEX: 27.4 KG/M2 | OXYGEN SATURATION: 97 % | HEIGHT: 69 IN | DIASTOLIC BLOOD PRESSURE: 88 MMHG

## 2024-06-14 DIAGNOSIS — Z86.711 HISTORY OF PULMONARY EMBOLUS (PE): ICD-10-CM

## 2024-06-14 DIAGNOSIS — N30.00 ACUTE CYSTITIS WITHOUT HEMATURIA: ICD-10-CM

## 2024-06-14 DIAGNOSIS — R47.1 DYSARTHRIA: ICD-10-CM

## 2024-06-14 DIAGNOSIS — D69.6 THROMBOCYTOPENIA: ICD-10-CM

## 2024-06-14 DIAGNOSIS — Z86.73 HISTORY OF TRANSIENT ISCHEMIC ATTACK (TIA): ICD-10-CM

## 2024-06-14 DIAGNOSIS — I25.10 CORONARY ARTERY DISEASE INVOLVING NATIVE CORONARY ARTERY OF NATIVE HEART WITHOUT ANGINA PECTORIS: ICD-10-CM

## 2024-06-14 DIAGNOSIS — K21.9 GASTROESOPHAGEAL REFLUX DISEASE, UNSPECIFIED WHETHER ESOPHAGITIS PRESENT: ICD-10-CM

## 2024-06-14 DIAGNOSIS — E78.00 HYPERCHOLESTEROLEMIA: ICD-10-CM

## 2024-06-14 DIAGNOSIS — Z76.89 ENCOUNTER TO ESTABLISH CARE: Primary | ICD-10-CM

## 2024-06-14 PROCEDURE — 99204 OFFICE O/P NEW MOD 45 MIN: CPT | Mod: ,,, | Performed by: NURSE PRACTITIONER

## 2024-06-14 PROCEDURE — 3008F BODY MASS INDEX DOCD: CPT | Mod: CPTII,,, | Performed by: NURSE PRACTITIONER

## 2024-06-14 PROCEDURE — 1159F MED LIST DOCD IN RCRD: CPT | Mod: CPTII,,, | Performed by: NURSE PRACTITIONER

## 2024-06-14 PROCEDURE — 3079F DIAST BP 80-89 MM HG: CPT | Mod: CPTII,,, | Performed by: NURSE PRACTITIONER

## 2024-06-14 PROCEDURE — 3074F SYST BP LT 130 MM HG: CPT | Mod: CPTII,,, | Performed by: NURSE PRACTITIONER

## 2024-06-14 PROCEDURE — 1160F RVW MEDS BY RX/DR IN RCRD: CPT | Mod: CPTII,,, | Performed by: NURSE PRACTITIONER

## 2024-06-14 RX ORDER — PANTOPRAZOLE SODIUM 40 MG/1
40 TABLET, DELAYED RELEASE ORAL DAILY
Qty: 30 TABLET | Refills: 0 | Status: SHIPPED | OUTPATIENT
Start: 2024-06-14 | End: 2024-07-14

## 2024-06-14 NOTE — TELEPHONE ENCOUNTER
Please let him know that I sent a prescription for pantoprazole.  This will help to coat his stomach just in case he has any irritation in the lining of the stomach or ulcers.  He has at a higher risk for bleeding due to his low platelet count

## 2024-06-14 NOTE — PROGRESS NOTES
"Patient ID: Francis Berman  : 1960    Chief Complaint: ER Follow up    Allergies: Patient is allergic to sulfa (sulfonamide antibiotics).     History of Present Illness:  The patient is a 64 y.o. White male who presents to clinic for follow up on ER Follow up. He was admitted to [facility] 24 with dysarthria and concern for CVA.  Symptoms eventually resolved and discharged home.  He has a history of clotting disorder with multiple PEs in the past but he had been off of his Coumadin for a while.  He was discharged home with Eliquis.  He was also treated for UTI with 5 days of levofloxacin.  Urine culture positive for Klebsiella.     EKG: Normal sinus rhythm. Right bundle branch block   Left anterior fascicular block. Bifascicular block. Possible Lateral infarct, age undetermined. Cannot rule out Inferior infarct (masked by fascicular block?)    CT brain: no acute intracranial abnormalities    Diagnosed with PE x2 about 10-12 years ago. He was unable to keep up with labs due to work schedule. States "I never felt bad so I didn't need to go to the doctor." He works as a .     History of CAD with 2 vessel ( maker) CABG at age 33. He stopped seeing cardiology and stopped taking all of his medications several years ago. Denies chest pain or shortness of breath.     He quit smoking 3 days ago after smoking for 40 years. He was drinking a few beers per day when he was off but has not had any beer in 2 weeks.     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     Social History:  reports that he has been smoking cigarettes. He started smoking about 40 years ago. He has a 60.6 pack-year smoking history. He has never used smokeless tobacco. He reports current alcohol use.    Past Medical History:  has a past medical history of Dysarthria, History of heart attack, History of open heart surgery, and Hyperlipidemia.    Current Medications:  Current Outpatient Medications   Medication " "Instructions    apixaban (ELIQUIS) 5 mg, Oral, 2 times daily    atorvastatin (LIPITOR) 40 mg, Oral, Daily    pantoprazole (PROTONIX) 40 mg, Oral, Daily       Review of Systems   Constitutional:  Negative for activity change, appetite change, fatigue and fever.   HENT:  Negative for ear pain, hearing loss and trouble swallowing.    Eyes:  Negative for pain, redness and visual disturbance.   Respiratory:  Negative for cough, chest tightness and shortness of breath.    Cardiovascular:  Negative for chest pain, palpitations, leg swelling and claudication.   Gastrointestinal:  Positive for abdominal pain. Negative for blood in stool, change in bowel habit, constipation, diarrhea, nausea and vomiting.   Endocrine: Negative for cold intolerance, heat intolerance, polydipsia, polyphagia and polyuria.   Genitourinary:  Negative for dysuria, frequency and hematuria.   Musculoskeletal:  Negative for gait problem and joint swelling.   Integumentary:  Negative for rash, wound and mole/lesion.   Neurological:  Negative for dizziness, seizures, weakness, headaches and memory loss.   Hematological:  Negative for adenopathy. Does not bruise/bleed easily.   Psychiatric/Behavioral:  Negative for confusion and sleep disturbance. The patient is not nervous/anxious.         Visit Vitals  /88 (BP Location: Left arm)   Pulse 80   Temp 98.1 °F (36.7 °C) (Temporal)   Ht 5' 9" (1.753 m)   Wt 83.9 kg (185 lb)   SpO2 97%   BMI 27.32 kg/m²       Physical Exam  Vitals reviewed.   Constitutional:       General: He is not in acute distress.     Appearance: Normal appearance. He is not ill-appearing.   HENT:      Right Ear: Tympanic membrane, ear canal and external ear normal.      Left Ear: Tympanic membrane, ear canal and external ear normal.      Nose: Nose normal.      Mouth/Throat:      Mouth: Mucous membranes are moist.   Eyes:      General: No scleral icterus.     Extraocular Movements: Extraocular movements intact.      " Conjunctiva/sclera: Conjunctivae normal.      Pupils: Pupils are equal, round, and reactive to light.   Cardiovascular:      Rate and Rhythm: Normal rate and regular rhythm.      Pulses: Normal pulses.      Heart sounds: Normal heart sounds.   Pulmonary:      Effort: Pulmonary effort is normal.      Breath sounds: Normal breath sounds.   Abdominal:      General: Abdomen is flat. Bowel sounds are normal.      Palpations: Abdomen is soft.      Tenderness: There is no abdominal tenderness.   Musculoskeletal:         General: Normal range of motion.      Cervical back: Normal range of motion and neck supple. No tenderness.      Right lower leg: No edema.      Left lower leg: No edema.   Lymphadenopathy:      Cervical: No cervical adenopathy.   Skin:     General: Skin is warm and dry.   Neurological:      Mental Status: He is alert and oriented to person, place, and time. Mental status is at baseline.   Psychiatric:         Mood and Affect: Mood normal.         Thought Content: Thought content normal.         Judgment: Judgment normal.          Labs Reviewed:  Chemistry:  Lab Results   Component Value Date     06/08/2024    K 4.0 06/08/2024    BUN 14 06/08/2024    CREATININE 1.26 (H) 06/08/2024    EGFRNORACEVR 63 06/08/2024    GLUCOSE 123 (H) 06/08/2024    CALCIUM 8.7 06/08/2024    ALKPHOS 167 (H) 06/07/2024    LABPROT 7.4 06/07/2024    ALBUMIN 4.2 06/07/2024    AST 51 06/07/2024    ALT 49 (H) 06/07/2024    MG 2.20 06/08/2024        Hematology:  Lab Results   Component Value Date    WBC 6.33 06/08/2024    RBC 4.34 06/08/2024    HGB 14.0 06/08/2024    HCT 38.1 06/08/2024    MCV 87.8 06/08/2024    MCH 32.3 06/08/2024    MCHC 36.7 06/08/2024    RDW 12.3 06/08/2024     (L) 06/08/2024    MPV 8.4 (L) 06/08/2024     Assessment & Plan:  1. Encounter to establish care    2. Dysarthria  Assessment & Plan:  Resolved      3. Acute cystitis without hematuria  Assessment & Plan:  Positive for Klebsiella and completed  levofloxacin for 5 days      4. Thrombocytopenia  Assessment & Plan:  Platelet count 101  Labs for HIV and hep C    Orders:  -     HIV 1/2 Ag/Ab (4th Gen); Future; Expected date: 06/14/2024  -     Hepatitis C Antibody; Future; Expected date: 06/14/2024  -     Hemoglobin A1C; Future; Expected date: 06/14/2024    5. Coronary artery disease involving native coronary artery of native heart without angina pectoris  Assessment & Plan:  Obtain records from Dr. Montano   Refer to cardiology for monitoring     Orders:  -     TSH; Future; Expected date: 06/14/2024  -     Ambulatory referral/consult to Cardiology; Future; Expected date: 06/21/2024    6. History of pulmonary embolus (PE)  Overview:  On Eliquis    Assessment & Plan:  Discussed bleeding precautions      7. Hypercholesterolemia  Overview:  On atorvastatin    Assessment & Plan:  Obtain FLP      8. Gastroesophageal reflux disease, unspecified whether esophagitis present  Assessment & Plan:  Begin pantoprazole     Orders:  -     pantoprazole (PROTONIX) 40 MG tablet; Take 1 tablet (40 mg total) by mouth once daily.  Dispense: 30 tablet; Refill: 0    9. History of transient ischemic attack (TIA)         Future Appointments   Date Time Provider Department Center   6/28/2024  9:30 AM Luzmaria Cooper FNP [facility]         Follow up in about 2 weeks (around 6/28/2024) for lab f/u . Call sooner if needed.    CIERA Levy

## 2024-06-17 ENCOUNTER — APPOINTMENT (OUTPATIENT)
Dept: LAB | Facility: HOSPITAL | Age: 64
End: 2024-06-17
Attending: NURSE PRACTITIONER
Payer: COMMERCIAL

## 2024-06-17 LAB
EST. AVERAGE GLUCOSE BLD GHB EST-MCNC: 105.4 MG/DL (ref 70–115)
HBA1C MFR BLD: 5.3 % (ref 4–6)
HCV AB SERPL QL IA: NONREACTIVE
HIV 1+2 AB+HIV1 P24 AG SERPL QL IA: NONREACTIVE
TSH SERPL-ACNC: 3.49 UIU/ML (ref 0.36–3.74)

## 2024-06-22 NOTE — PHYSICIAN QUERY
Please clarify if the diagnosis identified in the query has been:     Ruled in  Ruled in, now resolved

## 2024-06-28 ENCOUNTER — OFFICE VISIT (OUTPATIENT)
Dept: FAMILY MEDICINE | Facility: CLINIC | Age: 64
End: 2024-06-28
Payer: COMMERCIAL

## 2024-06-28 VITALS
SYSTOLIC BLOOD PRESSURE: 122 MMHG | WEIGHT: 186 LBS | DIASTOLIC BLOOD PRESSURE: 80 MMHG | BODY MASS INDEX: 27.55 KG/M2 | HEIGHT: 69 IN | OXYGEN SATURATION: 98 % | TEMPERATURE: 98 F | HEART RATE: 68 BPM

## 2024-06-28 DIAGNOSIS — K21.9 GASTROESOPHAGEAL REFLUX DISEASE, UNSPECIFIED WHETHER ESOPHAGITIS PRESENT: ICD-10-CM

## 2024-06-28 DIAGNOSIS — E78.00 HYPERCHOLESTEROLEMIA: ICD-10-CM

## 2024-06-28 DIAGNOSIS — F17.211 NICOTINE DEPENDENCE, CIGARETTES, IN REMISSION: ICD-10-CM

## 2024-06-28 DIAGNOSIS — Z85.820 HISTORY OF MALIGNANT MELANOMA OF SKIN: ICD-10-CM

## 2024-06-28 DIAGNOSIS — D69.6 THROMBOCYTOPENIA: Primary | ICD-10-CM

## 2024-06-28 DIAGNOSIS — Z86.711 HISTORY OF PULMONARY EMBOLUS (PE): ICD-10-CM

## 2024-06-28 PROBLEM — I34.0 MILD MITRAL REGURGITATION: Status: ACTIVE | Noted: 2024-06-28

## 2024-06-28 PROBLEM — I25.2 HISTORY OF MI (MYOCARDIAL INFARCTION): Status: ACTIVE | Noted: 2024-06-28

## 2024-06-28 LAB
ALBUMIN SERPL-MCNC: 3.9 G/DL (ref 3.4–5)
ALBUMIN/GLOB SERPL: 1.3 RATIO
ALP SERPL-CCNC: 210 UNIT/L (ref 50–144)
ALT SERPL-CCNC: 29 UNIT/L (ref 1–45)
ANION GAP SERPL CALC-SCNC: 7 MEQ/L (ref 2–13)
AST SERPL-CCNC: 28 UNIT/L (ref 17–59)
BASOPHILS # BLD AUTO: 0.06 X10(3)/MCL (ref 0.01–0.08)
BASOPHILS NFR BLD AUTO: 0.9 % (ref 0.1–1.2)
BILIRUB SERPL-MCNC: 0.7 MG/DL (ref 0–1)
BUN SERPL-MCNC: 18 MG/DL (ref 7–20)
CALCIUM SERPL-MCNC: 9.1 MG/DL (ref 8.4–10.2)
CHLORIDE SERPL-SCNC: 103 MMOL/L (ref 98–110)
CO2 SERPL-SCNC: 27 MMOL/L (ref 21–32)
CREAT SERPL-MCNC: 1.54 MG/DL (ref 0.66–1.25)
CREAT/UREA NIT SERPL: 12 (ref 12–20)
EOSINOPHIL # BLD AUTO: 0.19 X10(3)/MCL (ref 0.04–0.54)
EOSINOPHIL NFR BLD AUTO: 2.8 % (ref 0.7–7)
ERYTHROCYTE [DISTWIDTH] IN BLOOD BY AUTOMATED COUNT: 12.4 %
GFR SERPLBLD CREATININE-BSD FMLA CKD-EPI: 49 ML/MIN/1.73/M2
GLOBULIN SER-MCNC: 3 GM/DL (ref 2–3.9)
GLUCOSE SERPL-MCNC: 85 MG/DL (ref 70–115)
HCT VFR BLD AUTO: 40.1 % (ref 36–52)
HGB BLD-MCNC: 14.5 G/DL (ref 13–18)
IMM GRANULOCYTES # BLD AUTO: 0.05 X10(3)/MCL (ref 0–0.03)
IMM GRANULOCYTES NFR BLD AUTO: 0.7 % (ref 0–0.5)
LYMPHOCYTES # BLD AUTO: 1.37 X10(3)/MCL (ref 1.32–3.57)
LYMPHOCYTES NFR BLD AUTO: 20.5 % (ref 20–55)
MCH RBC QN AUTO: 31.5 PG (ref 27–34)
MCHC RBC AUTO-ENTMCNC: 36.2 G/DL (ref 31–37)
MCV RBC AUTO: 87 FL (ref 79–99)
MONOCYTES # BLD AUTO: 0.42 X10(3)/MCL (ref 0.3–0.82)
MONOCYTES NFR BLD AUTO: 6.3 % (ref 4.7–12.5)
NEUTROPHILS # BLD AUTO: 4.6 X10(3)/MCL (ref 1.78–5.38)
NEUTROPHILS NFR BLD AUTO: 68.8 % (ref 37–73)
NRBC BLD AUTO-RTO: 0 %
PLATELET # BLD AUTO: 161 X10(3)/MCL (ref 140–371)
PMV BLD AUTO: 9 FL (ref 9.4–12.4)
POTASSIUM SERPL-SCNC: 4.5 MMOL/L (ref 3.5–5.1)
PROT SERPL-MCNC: 6.9 GM/DL (ref 6.3–8.2)
RBC # BLD AUTO: 4.61 X10(6)/MCL (ref 4–6)
SODIUM SERPL-SCNC: 137 MMOL/L (ref 136–145)
WBC # BLD AUTO: 6.69 X10(3)/MCL (ref 4–11.5)

## 2024-06-28 PROCEDURE — 85025 COMPLETE CBC W/AUTO DIFF WBC: CPT | Performed by: NURSE PRACTITIONER

## 2024-06-28 PROCEDURE — 80053 COMPREHEN METABOLIC PANEL: CPT | Performed by: NURSE PRACTITIONER

## 2024-06-28 RX ORDER — PANTOPRAZOLE SODIUM 40 MG/1
40 TABLET, DELAYED RELEASE ORAL DAILY
Qty: 90 TABLET | Refills: 3 | Status: SHIPPED | OUTPATIENT
Start: 2024-06-28 | End: 2025-06-28

## 2024-06-28 RX ORDER — ATORVASTATIN CALCIUM 40 MG/1
40 TABLET, FILM COATED ORAL DAILY
Qty: 90 TABLET | Refills: 3 | Status: SHIPPED | OUTPATIENT
Start: 2024-06-28 | End: 2025-06-28

## 2024-06-28 NOTE — ASSESSMENT & PLAN NOTE
Negative hep C antibody and HIV  Obtain peripheral smear today   Obtain ultrasound of abdomen due to history of chronic alcohol use

## 2024-07-23 NOTE — PROGRESS NOTES
"Patient ID: Francis Berman  : 1960    Chief Complaint: Results    Allergies: Patient is allergic to promethazine and sulfa (sulfonamide antibiotics).     History of Present Illness:  The patient is a 64 y.o. White male who presents to clinic for follow up on Results.  During hospitalization, platelet count was decreased anywhere from . He decreased alcohol intake significantly since discharge from hospital. He now drinks socially but drank on a daily basis for several years.     He had a recent visit with Cardiology for CAD.  Denies any chest pain or shortness of breath.    Social History:  reports that he has been smoking cigarettes. He started smoking about 40 years ago. He has a 60.6 pack-year smoking history. He has never used smokeless tobacco. He reports current alcohol use.    Past Medical History:  has a past medical history of Dysarthria, History of heart attack, History of open heart surgery, and Hyperlipidemia.    Current Medications:  Current Outpatient Medications   Medication Instructions    apixaban (ELIQUIS) 5 mg, Oral, 2 times daily    atorvastatin (LIPITOR) 40 mg, Oral, Daily    pantoprazole (PROTONIX) 40 mg, Oral, Daily     ROS: See HPI    Visit Vitals  /80 (BP Location: Right arm)   Pulse 68   Temp 97.9 °F (36.6 °C) (Temporal)   Ht 5' 9" (1.753 m)   Wt 84.4 kg (186 lb)   SpO2 98%   BMI 27.47 kg/m²       Physical Exam  Vitals reviewed.   Constitutional:       Appearance: Normal appearance.   Cardiovascular:      Rate and Rhythm: Normal rate and regular rhythm.      Heart sounds: Normal heart sounds.   Pulmonary:      Effort: Pulmonary effort is normal.      Breath sounds: Normal breath sounds.   Skin:     General: Skin is warm and dry.   Neurological:      Mental Status: He is alert and oriented to person, place, and time. Mental status is at baseline.          Labs Reviewed:  Chemistry:  Lab Results   Component Value Date     2024    K 4.5 2024    BUN 18 " Post-Op Assessment Note    CV Status:  Stable  Pain Score: 0    Pain management: adequate       Mental Status:  Alert and awake   Hydration Status:  Stable   PONV Controlled:  None   Airway Patency:  Patent     Post Op Vitals Reviewed: Yes    No anethesia notable event occurred.    Staff: Anesthesiologist, CRNA         Please review nursing notes for a complete set of post-procedure vitals      BP      Temp      Pulse     Resp      SpO2         06/28/2024    CREATININE 1.54 (H) 06/28/2024    EGFRNORACEVR 49 06/28/2024    GLUCOSE 85 06/28/2024    CALCIUM 9.1 06/28/2024    ALKPHOS 210 (H) 06/28/2024    LABPROT 6.9 06/28/2024    ALBUMIN 3.9 06/28/2024    AST 28 06/28/2024    ALT 29 06/28/2024    MG 2.20 06/08/2024    TSH 3.490 06/17/2024        Lab Results   Component Value Date    HGBA1C 5.3 06/17/2024        Hematology:  Lab Results   Component Value Date    WBC 6.69 06/28/2024    RBC 4.61 06/28/2024    HGB 14.5 06/28/2024    HCT 40.1 06/28/2024    MCV 87.0 06/28/2024    MCH 31.5 06/28/2024    MCHC 36.2 06/28/2024    RDW 12.4 06/28/2024     06/28/2024    MPV 9.0 (L) 06/28/2024     Assessment & Plan:  1. Thrombocytopenia  Assessment & Plan:  Negative hep C antibody and HIV  Obtain peripheral smear today   Obtain ultrasound of abdomen due to history of chronic alcohol use    Orders:  -     US Abdomen Complete; Future; Expected date: 06/28/2024  -     Path Review, Peripheral Smear; Future; Expected date: 06/28/2024  -     CBC Auto Differential; Future; Expected date: 06/28/2024  -     Comprehensive Metabolic Panel; Future; Expected date: 06/28/2024    2. Gastroesophageal reflux disease, unspecified whether esophagitis present  Overview:  On pantoprazole    Assessment & Plan:  No complaints today   Continue pantoprazole    Orders:  -     pantoprazole (PROTONIX) 40 MG tablet; Take 1 tablet (40 mg total) by mouth once daily.  Dispense: 90 tablet; Refill: 3    3. History of pulmonary embolus (PE)  Overview:  On Eliquis    Assessment & Plan:  Bleeding precautions discussed    Orders:  -     apixaban (ELIQUIS) 5 mg Tab; Take 1 tablet (5 mg total) by mouth 2 (two) times daily.  Dispense: 180 tablet; Refill: 3    4. Hypercholesterolemia  Overview:  On atorvastatin    Assessment & Plan:  Refill atorvastatin    Orders:  -     atorvastatin (LIPITOR) 40 MG tablet; Take 1 tablet (40 mg total) by mouth once daily.  Dispense: 90 tablet; Refill: 3    5. History of  malignant melanoma of skin  Overview:  Followed by Dr. Sosa ([place])  Excision of right hand in early 2024      6. Nicotine dependence, cigarettes, in remission  Overview:  Daily smoker for 40 years up to 1.5 packs per day    Assessment & Plan:  Obtain low-dose CT of chest    Orders:  -     CT Chest Lung Screening Low Dose; Future; Expected date: 06/28/2024         Future Appointments   Date Time Provider Department Center   7/29/2024  9:30 AM Luzmaria Cooper FNP [facility]         Follow up in about 4 weeks (around 7/26/2024) for results . Call sooner if needed.    CIERA Levy    Lab Frequency Next Occurrence   Ambulatory referral/consult to Cardiology Once 06/21/2024

## 2025-05-19 ENCOUNTER — TELEPHONE (OUTPATIENT)
Dept: FAMILY MEDICINE | Facility: CLINIC | Age: 65
End: 2025-05-19
Payer: COMMERCIAL

## 2025-05-19 DIAGNOSIS — Z12.11 SCREENING FOR COLON CANCER: Primary | ICD-10-CM

## 2025-05-20 ENCOUNTER — TELEPHONE (OUTPATIENT)
Dept: FAMILY MEDICINE | Facility: CLINIC | Age: 65
End: 2025-05-20
Payer: COMMERCIAL

## 2025-05-20 NOTE — TELEPHONE ENCOUNTER
I discussed that with pt, he stated he works all the time and will call back when he can to make an appt.

## 2025-06-07 ENCOUNTER — HOSPITAL ENCOUNTER (EMERGENCY)
Facility: HOSPITAL | Age: 65
Discharge: ANOTHER HOSPITAL | End: 2025-06-08
Attending: INTERNAL MEDICINE
Payer: COMMERCIAL

## 2025-06-07 DIAGNOSIS — R07.9 CHEST PAIN: ICD-10-CM

## 2025-06-07 DIAGNOSIS — Z95.1 HISTORY OF CORONARY ARTERY BYPASS GRAFT: ICD-10-CM

## 2025-06-07 DIAGNOSIS — Z86.79 HISTORY OF CAD (CORONARY ARTERY DISEASE): ICD-10-CM

## 2025-06-07 DIAGNOSIS — R79.89 ELEVATED TROPONIN: ICD-10-CM

## 2025-06-07 DIAGNOSIS — Z79.01 CURRENT USE OF ANTICOAGULANT THERAPY: ICD-10-CM

## 2025-06-07 DIAGNOSIS — R07.9 CHEST PAIN, UNSPECIFIED TYPE: Primary | ICD-10-CM

## 2025-06-07 LAB
BASOPHILS # BLD AUTO: 0.03 X10(3)/MCL (ref 0.01–0.08)
BASOPHILS NFR BLD AUTO: 0.5 % (ref 0.1–1.2)
EOSINOPHIL # BLD AUTO: 0.16 X10(3)/MCL (ref 0.04–0.54)
EOSINOPHIL NFR BLD AUTO: 2.7 % (ref 0.7–7)
ERYTHROCYTE [DISTWIDTH] IN BLOOD BY AUTOMATED COUNT: 12.9 %
HCT VFR BLD AUTO: 39.4 % (ref 36–52)
HGB BLD-MCNC: 14.3 G/DL (ref 13–18)
IMM GRANULOCYTES # BLD AUTO: 0.04 X10(3)/MCL (ref 0–0.03)
IMM GRANULOCYTES NFR BLD AUTO: 0.7 % (ref 0–0.5)
LYMPHOCYTES # BLD AUTO: 1.11 X10(3)/MCL (ref 1.32–3.57)
LYMPHOCYTES NFR BLD AUTO: 18.6 % (ref 20–55)
MCH RBC QN AUTO: 31.6 PG (ref 27–34)
MCHC RBC AUTO-ENTMCNC: 36.3 G/DL (ref 31–37)
MCV RBC AUTO: 87.2 FL (ref 79–99)
MONOCYTES # BLD AUTO: 0.47 X10(3)/MCL (ref 0.3–0.82)
MONOCYTES NFR BLD AUTO: 7.9 % (ref 4.7–12.5)
NEUTROPHILS # BLD AUTO: 4.17 X10(3)/MCL (ref 1.78–5.38)
NEUTROPHILS NFR BLD AUTO: 69.6 % (ref 37–73)
NRBC BLD AUTO-RTO: 0 %
PLATELET # BLD AUTO: 141 X10(3)/MCL (ref 140–371)
PMV BLD AUTO: 8 FL (ref 9.4–12.4)
RBC # BLD AUTO: 4.52 X10(6)/MCL (ref 4–6)
WBC # BLD AUTO: 5.98 X10(3)/MCL (ref 4–11.5)

## 2025-06-07 PROCEDURE — 93005 ELECTROCARDIOGRAM TRACING: CPT

## 2025-06-07 PROCEDURE — 83735 ASSAY OF MAGNESIUM: CPT | Performed by: INTERNAL MEDICINE

## 2025-06-07 PROCEDURE — 99285 EMERGENCY DEPT VISIT HI MDM: CPT | Mod: 25

## 2025-06-07 PROCEDURE — 93010 ELECTROCARDIOGRAM REPORT: CPT | Mod: ,,, | Performed by: INTERNAL MEDICINE

## 2025-06-07 PROCEDURE — 80053 COMPREHEN METABOLIC PANEL: CPT | Performed by: INTERNAL MEDICINE

## 2025-06-07 PROCEDURE — 84484 ASSAY OF TROPONIN QUANT: CPT | Performed by: INTERNAL MEDICINE

## 2025-06-07 PROCEDURE — 85025 COMPLETE CBC W/AUTO DIFF WBC: CPT | Performed by: INTERNAL MEDICINE

## 2025-06-07 PROCEDURE — 83880 ASSAY OF NATRIURETIC PEPTIDE: CPT | Performed by: INTERNAL MEDICINE

## 2025-06-07 RX ORDER — FAMOTIDINE 20 MG/1
20 TABLET, FILM COATED ORAL
Status: COMPLETED | OUTPATIENT
Start: 2025-06-07 | End: 2025-06-08

## 2025-06-07 RX ORDER — ASPIRIN 325 MG
325 TABLET ORAL
Status: COMPLETED | OUTPATIENT
Start: 2025-06-07 | End: 2025-06-08

## 2025-06-08 ENCOUNTER — HOSPITAL ENCOUNTER (INPATIENT)
Facility: HOSPITAL | Age: 65
LOS: 2 days | Discharge: HOME | DRG: 250 | End: 2025-06-10
Attending: EMERGENCY MEDICINE | Admitting: HOSPITALIST
Payer: COMMERCIAL

## 2025-06-08 VITALS
HEIGHT: 68 IN | HEART RATE: 74 BPM | RESPIRATION RATE: 15 BRPM | BODY MASS INDEX: 30.46 KG/M2 | OXYGEN SATURATION: 95 % | TEMPERATURE: 98 F | WEIGHT: 201 LBS | SYSTOLIC BLOOD PRESSURE: 156 MMHG | DIASTOLIC BLOOD PRESSURE: 101 MMHG

## 2025-06-08 DIAGNOSIS — R07.9 CHEST PAIN: ICD-10-CM

## 2025-06-08 DIAGNOSIS — I50.9 CHF (CONGESTIVE HEART FAILURE): ICD-10-CM

## 2025-06-08 DIAGNOSIS — J18.9 PNEUMONIA DUE TO INFECTIOUS ORGANISM, UNSPECIFIED LATERALITY, UNSPECIFIED PART OF LUNG: ICD-10-CM

## 2025-06-08 DIAGNOSIS — I25.10 CAD (CORONARY ARTERY DISEASE): ICD-10-CM

## 2025-06-08 DIAGNOSIS — I21.4 NSTEMI (NON-ST ELEVATED MYOCARDIAL INFARCTION): Primary | ICD-10-CM

## 2025-06-08 LAB
ALBUMIN SERPL-MCNC: 3.9 G/DL (ref 3.4–5)
ALBUMIN/GLOB SERPL: 1.3 RATIO
ALP SERPL-CCNC: 199 UNIT/L (ref 50–144)
ALT SERPL-CCNC: 41 UNIT/L (ref 1–45)
ANION GAP SERPL CALC-SCNC: 3 MEQ/L (ref 2–13)
AORTIC SIZE INDEX (SOV): 1.5 CM/M2
APICAL FOUR CHAMBER EJECTION FRACTION: 35 %
APICAL TWO CHAMBER EJECTION FRACTION: 50 %
APTT PPP: 25.4 SECONDS (ref 23–29.4)
APTT PPP: 36.6 SECONDS (ref 23.2–33.7)
APTT PPP: 44.5 SECONDS (ref 23.2–33.7)
AST SERPL-CCNC: 35 UNIT/L (ref 17–59)
AV INDEX (PROSTH): 0.61
AV MEAN GRADIENT: 4 MMHG
AV PEAK GRADIENT: 7 MMHG
AV VALVE AREA BY VELOCITY RATIO: 1.9 CM²
AV VALVE AREA: 1.9 CM²
AV VELOCITY RATIO: 0.62
BASOPHILS # BLD AUTO: 0.05 X10(3)/MCL (ref 0.01–0.08)
BASOPHILS NFR BLD AUTO: 0.7 % (ref 0.1–1.2)
BILIRUB SERPL-MCNC: 0.5 MG/DL (ref 0–1)
BNP BLD-MCNC: 485 PG/ML (ref 0–124.9)
BSA FOR ECHO PROCEDURE: 2.09 M2
BUN SERPL-MCNC: 16 MG/DL (ref 7–20)
CALCIUM SERPL-MCNC: 9.1 MG/DL (ref 8.4–10.2)
CHLORIDE SERPL-SCNC: 107 MMOL/L (ref 98–110)
CO2 SERPL-SCNC: 26 MMOL/L (ref 21–32)
CREAT SERPL-MCNC: 1.28 MG/DL (ref 0.66–1.25)
CREAT/UREA NIT SERPL: 13 (ref 12–20)
CV ECHO LV RWT: 0.39 CM
D DIMER PPP IA.FEU-MCNC: 0.7 MG/L FEU (ref 0.19–0.5)
DOP CALC AO PEAK VEL: 1.3 M/S
DOP CALC AO VTI: 25.1 CM
DOP CALC LVOT AREA: 3.1 CM2
DOP CALC LVOT DIAMETER: 2 CM
DOP CALC LVOT PEAK VEL: 0.8 M/S
DOP CALC LVOT STROKE VOLUME: 48 CM3
DOP CALC MV VTI: 23.7 CM
DOP CALCLVOT PEAK VEL VTI: 15.3 CM
E WAVE DECELERATION TIME: 254 MSEC
E/A RATIO: 0.78
E/E' RATIO: 7 M/S
ECHO LV POSTERIOR WALL: 1 CM (ref 0.6–1.1)
EOSINOPHIL # BLD AUTO: 0.23 X10(3)/MCL (ref 0.04–0.54)
EOSINOPHIL NFR BLD AUTO: 3.4 % (ref 0.7–7)
ERYTHROCYTE [DISTWIDTH] IN BLOOD BY AUTOMATED COUNT: 12.9 %
FLUAV AG UPPER RESP QL IA.RAPID: NOT DETECTED
FLUBV AG UPPER RESP QL IA.RAPID: NOT DETECTED
FRACTIONAL SHORTENING: 25.5 % (ref 28–44)
GFR SERPLBLD CREATININE-BSD FMLA CKD-EPI: 61 ML/MIN/1.73/M2
GLOBULIN SER-MCNC: 3 GM/DL (ref 2–3.9)
GLUCOSE SERPL-MCNC: 158 MG/DL (ref 70–115)
HCT VFR BLD AUTO: 40.2 % (ref 36–52)
HGB BLD-MCNC: 14.5 G/DL (ref 13–18)
HR MV ECHO: 73 BPM
IMM GRANULOCYTES # BLD AUTO: 0.04 X10(3)/MCL (ref 0–0.03)
IMM GRANULOCYTES NFR BLD AUTO: 0.6 % (ref 0–0.5)
INR PPP: 0.9
INTERVENTRICULAR SEPTUM: 1.1 CM (ref 0.6–1.1)
LACTATE SERPL-SCNC: 1.5 MMOL/L (ref 0.5–2.2)
LEFT ATRIUM AREA SYSTOLIC (APICAL 2 CHAMBER): 12.7 CM2
LEFT ATRIUM AREA SYSTOLIC (APICAL 4 CHAMBER): 16.5 CM2
LEFT ATRIUM SIZE: 4.1 CM
LEFT ATRIUM VOLUME INDEX MOD: 19 ML/M2
LEFT ATRIUM VOLUME MOD: 38 ML
LEFT INTERNAL DIMENSION IN SYSTOLE: 3.8 CM (ref 2.1–4)
LEFT VENTRICLE DIASTOLIC VOLUME INDEX: 60.49 ML/M2
LEFT VENTRICLE DIASTOLIC VOLUME: 124 ML
LEFT VENTRICLE END DIASTOLIC VOLUME APICAL 2 CHAMBER: 121 ML
LEFT VENTRICLE END DIASTOLIC VOLUME APICAL 4 CHAMBER: 124 ML
LEFT VENTRICLE END SYSTOLIC VOLUME APICAL 2 CHAMBER: 29.8 ML
LEFT VENTRICLE END SYSTOLIC VOLUME APICAL 4 CHAMBER: 39.9 ML
LEFT VENTRICLE MASS INDEX: 97.9 G/M2
LEFT VENTRICLE SYSTOLIC VOLUME INDEX: 30.2 ML/M2
LEFT VENTRICLE SYSTOLIC VOLUME: 62 ML
LEFT VENTRICULAR INTERNAL DIMENSION IN DIASTOLE: 5.1 CM (ref 3.5–6)
LEFT VENTRICULAR MASS: 200.8 G
LV LATERAL E/E' RATIO: 5.8 M/S
LV SEPTAL E/E' RATIO: 9.1 M/S
LVED V (TEICH): 124 ML
LVES V (TEICH): 62 ML
LVOT MG: 1 MMHG
LVOT MV: 0.51 CM/S
LYMPHOCYTES # BLD AUTO: 1.21 X10(3)/MCL (ref 1.32–3.57)
LYMPHOCYTES NFR BLD AUTO: 17.7 % (ref 20–55)
MAGNESIUM SERPL-MCNC: 2.1 MG/DL (ref 1.8–2.4)
MCH RBC QN AUTO: 31.5 PG (ref 27–34)
MCHC RBC AUTO-ENTMCNC: 36.1 G/DL (ref 31–37)
MCV RBC AUTO: 87.2 FL (ref 79–99)
MONOCYTES # BLD AUTO: 0.44 X10(3)/MCL (ref 0.3–0.82)
MONOCYTES NFR BLD AUTO: 6.5 % (ref 4.7–12.5)
MV MEAN GRADIENT: 2 MMHG
MV PEAK A VEL: 0.82 M/S
MV PEAK E VEL: 0.64 M/S
MV PEAK GRADIENT: 3 MMHG
MV STENOSIS PRESSURE HALF TIME: 76 MS
MV VALVE AREA BY CONTINUITY EQUATION: 2.03 CM2
MV VALVE AREA P 1/2 METHOD: 2.89 CM2
NEUTROPHILS # BLD AUTO: 4.85 X10(3)/MCL (ref 1.78–5.38)
NEUTROPHILS NFR BLD AUTO: 71.1 % (ref 37–73)
NRBC BLD AUTO-RTO: 0 %
OHS LV EJECTION FRACTION SIMPSONS BIPLANE MOD: 43 %
PISA TR MAX VEL: 2.2 M/S
PLATELET # BLD AUTO: 144 X10(3)/MCL (ref 140–371)
PMV BLD AUTO: 8 FL (ref 9.4–12.4)
POTASSIUM SERPL-SCNC: 4.1 MMOL/L (ref 3.5–5.1)
PROT SERPL-MCNC: 6.9 GM/DL (ref 6.3–8.2)
PROTHROMBIN TIME: 9.4 SECONDS (ref 9.3–11.9)
RA PRESSURE ESTIMATED: 3 MMHG
RBC # BLD AUTO: 4.61 X10(6)/MCL (ref 4–6)
RSV A 5' UTR RNA NPH QL NAA+PROBE: NOT DETECTED
RV TB RVSP: 5 MMHG
SARS-COV-2 RNA RESP QL NAA+PROBE: NOT DETECTED
SINUS: 3.1 CM
SODIUM SERPL-SCNC: 136 MMOL/L (ref 136–145)
TDI LATERAL: 0.11 M/S
TDI SEPTAL: 0.07 M/S
TDI: 0.09 M/S
TR MAX PG: 19 MMHG
TRICUSPID ANNULAR PLANE SYSTOLIC EXCURSION: 1.9 CM
TROPONIN I SERPL-MCNC: 0.48 NG/ML (ref 0–0.03)
TROPONIN I SERPL-MCNC: 0.54 NG/ML (ref 0–0.03)
TROPONIN I SERPL-MCNC: 1.13 NG/ML (ref 0–0.04)
TV REST PULMONARY ARTERY PRESSURE: 22 MMHG
WBC # BLD AUTO: 6.82 X10(3)/MCL (ref 4–11.5)
Z-SCORE OF LEFT VENTRICULAR DIMENSION IN END DIASTOLE: -1.87
Z-SCORE OF LEFT VENTRICULAR DIMENSION IN END SYSTOLE: 0.07

## 2025-06-08 PROCEDURE — 85730 THROMBOPLASTIN TIME PARTIAL: CPT | Performed by: INTERNAL MEDICINE

## 2025-06-08 PROCEDURE — 25500020 PHARM REV CODE 255: Performed by: HOSPITALIST

## 2025-06-08 PROCEDURE — 93010 ELECTROCARDIOGRAM REPORT: CPT | Mod: ,,, | Performed by: INTERNAL MEDICINE

## 2025-06-08 PROCEDURE — 63600175 PHARM REV CODE 636 W HCPCS: Performed by: INTERNAL MEDICINE

## 2025-06-08 PROCEDURE — 85025 COMPLETE CBC W/AUTO DIFF WBC: CPT | Performed by: INTERNAL MEDICINE

## 2025-06-08 PROCEDURE — A4216 STERILE WATER/SALINE, 10 ML: HCPCS

## 2025-06-08 PROCEDURE — 25000003 PHARM REV CODE 250

## 2025-06-08 PROCEDURE — 85610 PROTHROMBIN TIME: CPT | Performed by: INTERNAL MEDICINE

## 2025-06-08 PROCEDURE — 25000003 PHARM REV CODE 250: Performed by: NURSE PRACTITIONER

## 2025-06-08 PROCEDURE — 63600175 PHARM REV CODE 636 W HCPCS: Performed by: EMERGENCY MEDICINE

## 2025-06-08 PROCEDURE — 25000242 PHARM REV CODE 250 ALT 637 W/ HCPCS: Performed by: EMERGENCY MEDICINE

## 2025-06-08 PROCEDURE — 25000003 PHARM REV CODE 250: Performed by: INTERNAL MEDICINE

## 2025-06-08 PROCEDURE — 96375 TX/PRO/DX INJ NEW DRUG ADDON: CPT

## 2025-06-08 PROCEDURE — 83605 ASSAY OF LACTIC ACID: CPT | Performed by: EMERGENCY MEDICINE

## 2025-06-08 PROCEDURE — 96374 THER/PROPH/DIAG INJ IV PUSH: CPT

## 2025-06-08 PROCEDURE — 84484 ASSAY OF TROPONIN QUANT: CPT | Performed by: EMERGENCY MEDICINE

## 2025-06-08 PROCEDURE — 96376 TX/PRO/DX INJ SAME DRUG ADON: CPT

## 2025-06-08 PROCEDURE — 63600175 PHARM REV CODE 636 W HCPCS: Performed by: PHYSICIAN ASSISTANT

## 2025-06-08 PROCEDURE — 25000003 PHARM REV CODE 250: Performed by: EMERGENCY MEDICINE

## 2025-06-08 PROCEDURE — 85730 THROMBOPLASTIN TIME PARTIAL: CPT | Performed by: HOSPITALIST

## 2025-06-08 PROCEDURE — 25500020 PHARM REV CODE 255: Performed by: EMERGENCY MEDICINE

## 2025-06-08 PROCEDURE — 85379 FIBRIN DEGRADATION QUANT: CPT | Performed by: INTERNAL MEDICINE

## 2025-06-08 PROCEDURE — 0241U COVID/RSV/FLU A&B PCR: CPT | Performed by: EMERGENCY MEDICINE

## 2025-06-08 PROCEDURE — 84484 ASSAY OF TROPONIN QUANT: CPT | Performed by: INTERNAL MEDICINE

## 2025-06-08 PROCEDURE — 93005 ELECTROCARDIOGRAM TRACING: CPT

## 2025-06-08 PROCEDURE — 96365 THER/PROPH/DIAG IV INF INIT: CPT | Mod: 59

## 2025-06-08 PROCEDURE — 99285 EMERGENCY DEPT VISIT HI MDM: CPT | Mod: 25

## 2025-06-08 PROCEDURE — 21400001 HC TELEMETRY ROOM

## 2025-06-08 PROCEDURE — 85730 THROMBOPLASTIN TIME PARTIAL: CPT | Performed by: EMERGENCY MEDICINE

## 2025-06-08 PROCEDURE — 87040 BLOOD CULTURE FOR BACTERIA: CPT | Performed by: EMERGENCY MEDICINE

## 2025-06-08 RX ORDER — CLOPIDOGREL BISULFATE 75 MG/1
75 TABLET ORAL DAILY
Status: DISCONTINUED | OUTPATIENT
Start: 2025-06-09 | End: 2025-06-10 | Stop reason: HOSPADM

## 2025-06-08 RX ORDER — HEPARIN SODIUM 5000 [USP'U]/ML
5000 INJECTION, SOLUTION INTRAVENOUS; SUBCUTANEOUS
Status: DISCONTINUED | OUTPATIENT
Start: 2025-06-08 | End: 2025-06-08

## 2025-06-08 RX ORDER — CEFTRIAXONE 2 G/1
2 INJECTION, POWDER, FOR SOLUTION INTRAMUSCULAR; INTRAVENOUS
Status: COMPLETED | OUTPATIENT
Start: 2025-06-08 | End: 2025-06-08

## 2025-06-08 RX ORDER — NITROGLYCERIN 20 MG/G
1 OINTMENT TOPICAL
Status: COMPLETED | OUTPATIENT
Start: 2025-06-08 | End: 2025-06-08

## 2025-06-08 RX ORDER — HYDRALAZINE HYDROCHLORIDE 20 MG/ML
20 INJECTION INTRAMUSCULAR; INTRAVENOUS EVERY 4 HOURS PRN
Status: DISCONTINUED | OUTPATIENT
Start: 2025-06-08 | End: 2025-06-10 | Stop reason: HOSPADM

## 2025-06-08 RX ORDER — HEPARIN SODIUM,PORCINE/D5W 25000/250
0-40 INTRAVENOUS SOLUTION INTRAVENOUS CONTINUOUS
Status: DISCONTINUED | OUTPATIENT
Start: 2025-06-08 | End: 2025-06-08 | Stop reason: HOSPADM

## 2025-06-08 RX ORDER — WATER FOR INJECTION,STERILE
VIAL (ML) INJECTION
Status: COMPLETED
Start: 2025-06-08 | End: 2025-06-08

## 2025-06-08 RX ORDER — HEPARIN SODIUM,PORCINE/D5W 25000/250
0-40 INTRAVENOUS SOLUTION INTRAVENOUS CONTINUOUS
Status: DISCONTINUED | OUTPATIENT
Start: 2025-06-08 | End: 2025-06-08

## 2025-06-08 RX ORDER — HEPARIN SODIUM,PORCINE/D5W 25000/250
0-40 INTRAVENOUS SOLUTION INTRAVENOUS CONTINUOUS
Status: DISCONTINUED | OUTPATIENT
Start: 2025-06-08 | End: 2025-06-10

## 2025-06-08 RX ORDER — ACETAMINOPHEN 325 MG/1
650 TABLET ORAL EVERY 4 HOURS PRN
Status: DISCONTINUED | OUTPATIENT
Start: 2025-06-08 | End: 2025-06-10 | Stop reason: HOSPADM

## 2025-06-08 RX ORDER — CEFTRIAXONE 1 G/1
1 INJECTION, POWDER, FOR SOLUTION INTRAMUSCULAR; INTRAVENOUS
Status: DISCONTINUED | OUTPATIENT
Start: 2025-06-08 | End: 2025-06-10 | Stop reason: HOSPADM

## 2025-06-08 RX ORDER — ATORVASTATIN CALCIUM 40 MG/1
40 TABLET, FILM COATED ORAL DAILY
Status: DISCONTINUED | OUTPATIENT
Start: 2025-06-08 | End: 2025-06-08 | Stop reason: HOSPADM

## 2025-06-08 RX ORDER — CLOPIDOGREL BISULFATE 300 MG/1
300 TABLET, FILM COATED ORAL ONCE
Status: COMPLETED | OUTPATIENT
Start: 2025-06-08 | End: 2025-06-08

## 2025-06-08 RX ORDER — ONDANSETRON HYDROCHLORIDE 2 MG/ML
4 INJECTION, SOLUTION INTRAVENOUS EVERY 6 HOURS PRN
Status: DISCONTINUED | OUTPATIENT
Start: 2025-06-08 | End: 2025-06-10 | Stop reason: HOSPADM

## 2025-06-08 RX ORDER — IPRATROPIUM BROMIDE AND ALBUTEROL SULFATE 2.5; .5 MG/3ML; MG/3ML
3 SOLUTION RESPIRATORY (INHALATION)
Status: COMPLETED | OUTPATIENT
Start: 2025-06-08 | End: 2025-06-08

## 2025-06-08 RX ORDER — ASPIRIN 81 MG/1
81 TABLET ORAL DAILY
Status: DISCONTINUED | OUTPATIENT
Start: 2025-06-08 | End: 2025-06-10

## 2025-06-08 RX ADMIN — HEPARIN SODIUM 12 UNITS/KG/HR: 10000 INJECTION, SOLUTION INTRAVENOUS at 03:06

## 2025-06-08 RX ADMIN — CEFTRIAXONE SODIUM 1 G: 1 INJECTION, POWDER, FOR SOLUTION INTRAMUSCULAR; INTRAVENOUS at 03:06

## 2025-06-08 RX ADMIN — CLOPIDOGREL BISULFATE 300 MG: 300 TABLET, FILM COATED ORAL at 12:06

## 2025-06-08 RX ADMIN — ASPIRIN 81 MG: 81 TABLET, DELAYED RELEASE ORAL at 12:06

## 2025-06-08 RX ADMIN — ASPIRIN 325 MG ORAL TABLET 325 MG: 325 PILL ORAL at 12:06

## 2025-06-08 RX ADMIN — IPRATROPIUM BROMIDE AND ALBUTEROL SULFATE 3 ML: .5; 3 SOLUTION RESPIRATORY (INHALATION) at 08:06

## 2025-06-08 RX ADMIN — AZITHROMYCIN MONOHYDRATE 500 MG: 500 INJECTION, POWDER, LYOPHILIZED, FOR SOLUTION INTRAVENOUS at 09:06

## 2025-06-08 RX ADMIN — HEPARIN SODIUM 15 UNITS/KG/HR: 10000 INJECTION, SOLUTION INTRAVENOUS at 09:06

## 2025-06-08 RX ADMIN — FAMOTIDINE 20 MG: 20 TABLET, FILM COATED ORAL at 12:06

## 2025-06-08 RX ADMIN — HEPARIN SODIUM 12 UNITS/KG/HR: 10000 INJECTION, SOLUTION INTRAVENOUS at 08:06

## 2025-06-08 RX ADMIN — PERFLUTREN 1 ML: 6.52 INJECTION, SUSPENSION INTRAVENOUS at 01:06

## 2025-06-08 RX ADMIN — CEFTRIAXONE SODIUM 2 G: 2 INJECTION, POWDER, FOR SOLUTION INTRAMUSCULAR; INTRAVENOUS at 09:06

## 2025-06-08 RX ADMIN — IOHEXOL 89 ML: 350 INJECTION, SOLUTION INTRAVENOUS at 08:06

## 2025-06-08 RX ADMIN — WATER: 1 INJECTION INTRAMUSCULAR; INTRAVENOUS; SUBCUTANEOUS at 02:06

## 2025-06-08 RX ADMIN — NITROGLYCERIN 1 INCH: 20 OINTMENT TOPICAL at 09:06

## 2025-06-08 NOTE — Clinical Note
The catheter was inserted into the ostial LIMA graft. An angiography was performed of the graft. The angiography was performed via power injection.  LIMA - LAD atretic

## 2025-06-08 NOTE — H&P
[facility]  Hospital Medicine History & Physical Examination       Patient Name: Francis Berman  MRN: [id number]  Patient Class: IP- Inpatient   Admission Date: 6/8/2025   Admitting Physician: Nayan Lei MD   Length of Stay: 0  Attending Physician: Nayan Lei MD   Primary Care Provider: Luzmaria Cooper FNP  Face-to-Face encounter date: 06/08/2025  Code Status: Full Code    Chief Complaint: Transfer (Transfer from [place] for NSTEMI. Pt c/o chest pain that started a few hours prior to arrival at [place]. Also reports cough and congestion. Denies pain currently. Hx of CAD. Heparin infusing. )        Patient information was obtained from patient, patient's family, past medical records and ER records.     HISTORY OF PRESENT ILLNESS:   Francis Berman is a 65 y.o. White male with a past medical history of hyperlipidemia, coronary artery disease status post stents and CABG, pulmonary embolism. The patient presented to [facility] on 6/8/2025 as a transfer from [facility]. Patient presented to outside facility on 06/08/2025 with complaints of chest pain.  Troponin was elevated at 0.480, pro  and D-dimer 0.70.  EKG revealed normal sinus rhythm, right bundle-branch block, left anterior fascicular block, bifascicular block, age undetermined possible lateral infarct and unable to rule out age undetermined inferior infarct.  Tele cardiology was consulted and recommended heparin drip with transferred to [facility] ED for Cardiology Services.  Upon presentation to [facility] initial vitals consisted of a temperature of 97.7° F, heart rate 74, blood pressure 137/85, respiratory rate 12 and SpO2 99% on room air.  Troponin x1 increased to 0.536 with repeat x2 increasing to 1.129.  CTA of the chest negative for pulmonary embolism but revealed perivascular adenopathy versus right middle lobe airspace consolidation as well as multiple enlarged mediastinal lymph nodes could be acute infectious process but  neoplastic/metastatic etiology unable to be excluded.  In ED patient received DuoNebs, Rocephin, azithromycin and nitro paste.  Patient reports chest pain last night (06/07/2025) was located throughout his chest without radiation and described as a stabbing sensation.  He rates chest pain on exam a 0/10.  He has been having a cough with green sputum.  Denies shortness of breath, fever, abdominal pain, nausea, vomiting and diarrhea.  Patient's smokes 1.5 packs of cigarettes per day.  Patient is admitted to hospital medicine services for further medical management.    PAST MEDICAL HISTORY:     Past Medical History:   Diagnosis Date    Dysarthria 06/07/2024    History of heart attack     History of open heart surgery     Hyperlipidemia        PAST SURGICAL HISTORY:     Past Surgical History:   Procedure Laterality Date    ANGIOPLASTY  1990       ALLERGIES:   Promethazine and Sulfa (sulfonamide antibiotics)    FAMILY HISTORY:   Mother: Stroke   Father: Myocardial infarction    SOCIAL HISTORY:     Social History     Tobacco Use    Smoking status: Every Day     Average packs/day: 1.5 packs/day for 40.4 years (60.6 ttl pk-yrs)     Types: Cigarettes     Start date: 1984    Smokeless tobacco: Never   Substance Use Topics    Alcohol use: Yes      Smokes 1.5 pack of cigarettes per day   Drinks alcohol twice a week   Denies illicit drug use    Screening for Social Drivers for health:  Patient screened for food insecurity, housing instability, transportation needs, utility difficulties, and interpersonal safety (select all that apply as identified as concern)  []Housing or Food  []Transportation Needs  []Utility Difficulties  []Interpersonal safety  [x]None    HOME MEDICATIONS:     Prior to Admission medications    Medication Sig Start Date End Date Taking? Authorizing Provider   apixaban (ELIQUIS) 5 mg Tab Take 1 tablet (5 mg total) by mouth 2 (two) times daily. 6/28/24 6/28/25  Luzmaria Cooper FNP   atorvastatin (LIPITOR) 40  MG tablet Take 1 tablet (40 mg total) by mouth once daily. 6/28/24 6/28/25  Luzmaria Cooper FNP   pantoprazole (PROTONIX) 40 MG tablet Take 1 tablet (40 mg total) by mouth once daily. 6/28/24 6/28/25  Luzmaria Cooper FNP       REVIEW OF SYSTEMS:   Except as documented, all other systems reviewed and negative     PHYSICAL EXAM:     VITAL SIGNS: 24 HRS MIN & MAX LAST   Temp  Min: 97.7 °F (36.5 °C)  Max: 98 °F (36.7 °C) 97.7 °F (36.5 °C)   BP  Min: 118/84  Max: 168/89 125/77   Pulse  Min: 64  Max: 90  68   Resp  Min: 10  Max: 19 11   SpO2  Min: 95 %  Max: 99 % 95 %       General appearance: Well-developed, well-nourished male in no apparent distress.  No family at bedside.  HEENT: Atraumatic head. Moist mucous membranes of oral cavity.  Lungs: Clear to auscultation bilaterally.   Heart: Regular rate and rhythm.   Abdomen: Soft, non-distended, non-tender. Bowel sounds are normal.   Extremities: No cyanosis, clubbing. No deformities.  Skin: No Rash. Warm and dry.  Neuro: Awake, alert and oriented. Motor and sensory exams grossly intact.  Psych/mental status: Appropriate mood and affect. Cooperative. Responds appropriately to questions.       LABS AND IMAGING:     Recent Labs   Lab 06/07/25  2344 06/08/25  0421   WBC 5.98 6.82   RBC 4.52 4.61   HGB 14.3 14.5   HCT 39.4 40.2   MCV 87.2 87.2   MCH 31.6 31.5   MCHC 36.3 36.1   RDW 12.9 12.9    144   MPV 8.0* 8.0*       Recent Labs   Lab 06/07/25  2344      K 4.1      CO2 26   BUN 16   CREATININE 1.28*   *   CALCIUM 9.1   MG 2.10   ALBUMIN 3.9   PROT 6.9   ALKPHOS 199*   ALT 41   AST 35   BILITOT 0.5       Microbiology Results (last 7 days)       Procedure Component Value Units Date/Time    Blood Culture [[id number]] Collected: 06/08/25 0931    Order Status: Sent Specimen: Blood from Arm, Left     Blood Culture [[id number]] Collected: 06/08/25 0931    Order Status: Sent Specimen: Blood from Antecubital, Left              Echo    Left Ventricle:  The left ventricle is normal in size. Normal wall   thickness. Normal wall motion. There is mildly reduced systolic function   with a visually estimated ejection fraction of 40 - 45%. Grade I diastolic   dysfunction.    Right Ventricle: Systolic function is normal. TAPSE is 1.9 cm.    Mitral Valve: There is trace regurgitation.    Tricuspid Valve: There is trace regurgitation.    Pulmonary Artery: The estimated pulmonary artery systolic pressure is   22 mmHg.    IVC/SVC: Normal venous pressure at 3 mmHg.    Pericardium: There is no pericardial effusion.  CTA Chest Non-Coronary (PE Studies)  EXAMINATION  CTA CHEST NON CORONARY (PE STUDIES)    CLINICAL HISTORY  Pulmonary embolism (PE) suspected, positive D-dimer;  dyspnea    TECHNIQUE  Post-contrast helical-acquisition CT images were obtained, with bolus timing to pulmonary arterial system for purposes of PE protocol CTA.  Multiplanar reconstructions, including MIP images, were accomplished by a CT technologist at a separate workstation, pushed to PACS for physician review.    CONTRAST  IV: Omnipaque 350, 89 mL    COMPARISON  None available at the time of initial interpretation.    FINDINGS  Images were reviewed in soft tissue, lung, and bone windows.    Exam quality: adequate for evaluation    Lines/tubes: none visualized    Pulmonary Vessels: No central or large segmental filling defect.  Soft tissue density is appreciated at the right middle lobe pulmonary arterial vasculature, may represent airspace consolidation or perivascular adenopathy.    Cardiovascular: No suggestion of right heart strain; the RV:LV ratio is <1. No significant heart chamber enlargement. There is no pericardial effusion. No focal contour irregularity or intraluminal abnormality is appreciated involving the visualized aorta and branch vessels. Mediastinal postoperative changes suggestive of CABG incidentally noted.    Lungs/Pleura: Central airways are patent. No acute airspace consolidation or  suspicious focal lesion. No pleural thickening, significant effusion, or evidence of pneumothorax.    Other Findings: Right perivascular lymph node is present adjacent to the ascending aorta, measuring 1.7 cm short axis.  There is also an enlarged right lower paratracheal node measuring 1.9 cm in least dimension.  Ovoid low-density nodule is present at the right thyroid lobe.  No convincing acute abnormality of the visualized upper abdomen.  No acute osseous displacement or destructive focal lesion.    IMPRESSION  1. No convincing central or segmental pulmonary thromboembolism.  2. Suspected perivascular adenopathy versus right middle lobe airspace consolidation, as well as multiple enlarged mediastinal lymph nodes.  This could be due to acute infectious process but neoplastic/metastatic etiology is not excluded.  3. Chronic secondary details discussed above.    RADIATION DOSE  Automated tube current modulation, weight-based exposure dosing, and/or iterative reconstruction technique utilized to reach lowest reasonably achievable exposure rate.    DLP: 226 mGy*cm    Electronically signed by: Blake Singh  Date:    06/08/2025  Time:    08:56  X-Ray Chest 1 View  Narrative: EXAMINATION:  Single view chest radiograph.    CLINICAL HISTORY:  Chest pain, unspecified    TECHNIQUE:  Single view of the chest.    COMPARISON:  None.    FINDINGS:  The lungs are clear without consolidation or effusion.  There is no pneumothorax.  The cardiac silhouette is normal in size.  There is no acute osseous abnormality.  Impression: No acute cardiopulmonary abnormality.    Electronically signed by: Vijay Ulloa MD  Date:    06/08/2025  Time:    08:25        ASSESSMENT & PLAN:   Assessment:  NSTEMI, type unknown   Right middle lobe community-acquired pneumonia  Tobacco use  Acute on chronic combined systolic and heart failure exacerbation  History of hyperlipidemia, coronary artery disease status post stents and CABG, pulmonary  embolism    Plan:  - Cardiology consulted.  Appreciate recommendations  - Echo ordered revealing LVEF 40-45% and grade 1 diastolic dysfunction  - Telemetry monitoring  - Continue with Rocephin and azithromycin  - Follow results of blood cultures  - Resume appropriate home medications when deemed necessary   - Labs in AM      VTE Prophylaxis: will be placed on heparin for DVT prophylaxis and will be advised to be as mobile as possible and sit in a chair as tolerated      __________________________________________________________________________  INPATIENT LIST OF MEDICATIONS   Current Medications[1]      Scheduled Meds:   aspirin  81 mg Oral Daily    [START ON 6/9/2025] clopidogreL  75 mg Oral Daily    heparin (PORCINE)  43.9 Units/kg Intravenous Once     Continuous Infusions:   heparin (porcine) in D5W  0-40 Units/kg/hr Intravenous Continuous 13.7 mL/hr at 06/08/25 0943 15 Units/kg/hr at 06/08/25 0943     PRN Meds:.  Current Facility-Administered Medications:     acetaminophen, 650 mg, Oral, Q4H PRN    heparin (PORCINE), 43.9 Units/kg, Intravenous, PRN    heparin (PORCINE), 30 Units/kg, Intravenous, PRN    hydrALAZINE, 20 mg, Intravenous, Q4H PRN    ondansetron, 4 mg, Intravenous, Q6H PRN      Discharge Planning and Disposition: Anticipated discharge to be determined.    I, CHRISTINA Persaud, have reviewed and discussed the case with Dr. Nayan Lei MD    Please see the following addendum for further assessment and plan from there attending MD.      Portion of this note is dictated using EMR integrated voice recognition software and may be subjected to voice recognition errors not corrected with proofreading. Please  for clarification if needed.       Melina Edmondson PA-C  06/08/2025    For this patient encounter, I reviewed the NP/PA/resident documentation, treatment plan, and medical decision making; and I had face-to-face time with this patient.     History: Reviewed HPI, medical, surgical,  family, and social histories as above    Physical exam: Agree with documentation as above    Treatment Plan:  5-year-old male admitted to hospital medicine on 06/08/2025 secondary to chest pain.  Patient was noted to have NSTEMI.  Cardiology recommending heparin drip.  Vital signs are stable.  Troponin increased to 1.129.  CTA of the chest was negative PE but did reveal right middle lobe consolidation.  Started on empiric Rocephin and azithromycin.  No leukocytosis noted.  His his creatinine was 1.28.  On IV fluids.  Possibly some interstitial edema as well.  Will follow up blood cultures titrate antibiotics as needed.  Follow up further recommendations from Cardiology    Critical care diagnosis: NSTEMI, heparin gtt  Critical care interventions: hands on evaluation, review of labs/radiographs/records and discussions with family  Critical care time spent: >32 minutes      Nayan Lei MD           [1]   Current Facility-Administered Medications:     acetaminophen tablet 650 mg, 650 mg, Oral, Q4H PRN, Melina Edmondson PA-C    aspirin EC tablet 81 mg, 81 mg, Oral, Daily, Jeremiah Edmondson ANP, 81 mg at 06/08/25 1212    [START ON 6/9/2025] clopidogreL tablet 75 mg, 75 mg, Oral, Daily, Jeremiah Edmondson ANP    heparin 25,000 units in dextrose 5% (100 units/ml) IV bolus from bag LOW INTENSITY nomogram - [facility], 43.9 Units/kg, Intravenous, Once, Jorgito Clemons III, MD    heparin 25,000 units in dextrose 5% (100 units/ml) IV bolus from bag LOW INTENSITY nomogram - [facility], 43.9 Units/kg, Intravenous, PRN, Jorgito Clemons III, MD, 4,000 Units at 06/08/25 0945    heparin 25,000 units in dextrose 5% (100 units/ml) IV bolus from bag LOW INTENSITY nomogram - [facility], 30 Units/kg, Intravenous, PRN, Jorgito Clemons III, MD    heparin 25,000 units in dextrose 5% 250 mL (100 units/mL) infusion LOW INTENSITY nomogram - [facility], 0-40 Units/kg/hr, Intravenous, Continuous, Jorgito Clemons III, MD, Last Rate: 13.7 mL/hr at 06/08/25 0943, 15  Units/kg/hr at 06/08/25 0943    hydrALAZINE injection 20 mg, 20 mg, Intravenous, Q4H PRN, Melina Edmondson PA-C    ondansetron injection 4 mg, 4 mg, Intravenous, Q6H PRN, Melina Edmondson PA-C    Current Outpatient Medications:     apixaban (ELIQUIS) 5 mg Tab, Take 1 tablet (5 mg total) by mouth 2 (two) times daily., Disp: 180 tablet, Rfl: 3    atorvastatin (LIPITOR) 40 MG tablet, Take 1 tablet (40 mg total) by mouth once daily., Disp: 90 tablet, Rfl: 3    pantoprazole (PROTONIX) 40 MG tablet, Take 1 tablet (40 mg total) by mouth once daily., Disp: 90 tablet, Rfl: 3

## 2025-06-08 NOTE — CONSULTS
Inpatient consult to Cardiology  Consult performed by: Jeremiah Edmondson ANP  Consult ordered by: Melina Edmondson PA-C  Reason for consult: NSTEMI        [facility]    Cardiology  Consult Note    Patient Name: Francis Berman  MRN: [id number]  Admission Date: 6/8/2025  Hospital Length of Stay: 0 days  Code Status: Prior   Attending Provider: Nayan Lei MD   Consulting Provider: BRYAN Smith  Primary Care Physician: Luzmaria Cooper FNP  Principal Problem:<principal problem not specified>    Patient information was obtained from patient, past medical records, and ER records.     Subjective:     Chief Complaint/Reason for Consult: NSTEMI     HPI: Mr. Berman is a 66 y/o male who is unknown to [facility]. He has a history of coronary artery disease status post coronary artery bypass grafting hypercoagulable blood disorder currently on chronic anticoagulation, pulmonary embolus, hypertension and alcohol abuse. Since the ER this evening with complaints of chest discomfort that started about 2 hours prior to his arrival.  It was substernal pressure without radiation or shortness of breath.  KG done upon arrival shows a normal sinus rhythm with a right bundle branch block.  There are no previous EKGs to compare.  Chest x-ray shows some mild congestion.  Lab work showed a troponin of 0.480 and BNP was mildly elevated at 485. He underwent a Telecardiology Consult and it was recommended for transfer to a PCI capable facility. He was placed on Heparin and his Troponin did uptrend to > 1. [facility] was consutled for NSTEMI.     PMH: Dysarthria, MI, HLD, CAD  PSH: Sternotomy (Sternal Wires on XR)/Unknown Type, Angiogram  Family History: Mother, L, PAD, DM II; Father, DM II  Social History: 1.5 ppd Tobacco Use for 40+ Years; + ETOH Use; Denies Illicit Drug Use     Previous Cardiac Diagnostics: None Available for Review   Subjectively Reported:  CABG in the 90's  PCI stenting pre and post bypass  He is  unsure of when    Review of patient's allergies indicates:   Allergen Reactions    Promethazine Hallucinations    Sulfa (sulfonamide antibiotics)      Current Facility-Administered Medications on File Prior to Encounter   Medication    [COMPLETED] aspirin tablet 325 mg    [COMPLETED] famotidine tablet 20 mg    [COMPLETED] heparin 25,000 units in dextrose 5% (100 units/ml) IV bolus from bag LOW INTENSITY NOMOGRAM - [facility]    [DISCONTINUED] atorvastatin tablet 40 mg    [DISCONTINUED] heparin (porcine) injection 5,000 Units    [DISCONTINUED] heparin 25,000 units in dextrose 5% (100 units/ml) IV bolus from bag LOW INTENSITY NOMOGRAM - [facility]    [DISCONTINUED] heparin 25,000 units in dextrose 5% (100 units/ml) IV bolus from bag LOW INTENSITY NOMOGRAM - [facility]    [DISCONTINUED] heparin 25,000 units in dextrose 5% 250 mL (100 units/mL) infusion LOW INTENSITY nomogram - [facility]    [DISCONTINUED] iohexoL (OMNIPAQUE 350) injection 70 mL     Current Outpatient Medications on File Prior to Encounter   Medication Sig    apixaban (ELIQUIS) 5 mg Tab Take 1 tablet (5 mg total) by mouth 2 (two) times daily.    atorvastatin (LIPITOR) 40 MG tablet Take 1 tablet (40 mg total) by mouth once daily.    pantoprazole (PROTONIX) 40 MG tablet Take 1 tablet (40 mg total) by mouth once daily.     Review of Systems   Constitutional:  Positive for fatigue.   Respiratory:  Positive for shortness of breath.    Cardiovascular:  Positive for chest pain. Negative for palpitations and leg swelling.   All other systems reviewed and are negative.    Objective:     Vital Signs (Most Recent):  Temp: 97.7 °F (36.5 °C) (06/08/25 0738)  Pulse: 72 (06/08/25 0958)  Resp: 19 (06/08/25 0958)  BP: (!) 158/111 (06/08/25 0958)  SpO2: 98 % (06/08/25 0958) Vital Signs (24h Range):  Temp:  [97.7 °F (36.5 °C)-98 °F (36.7 °C)] 97.7 °F (36.5 °C)  Pulse:  [64-90] 72  Resp:  [10-19] 19  SpO2:  [95 %-99 %] 98 %  BP: (131-168)/() 158/111   Weight: 91.2 kg (201 lb 1 oz)  Body  mass index is 30.57 kg/m².  SpO2: 98 %     No intake or output data in the 24 hours ending 06/08/25 1122  Lines/Drains/Airways       Peripheral Intravenous Line  Duration                  Peripheral IV - Single Lumen 06/08/25 0819 20 G Anterior;Left;Proximal Forearm <1 day         Peripheral IV - Single Lumen 06/08/25 20 G Right Antecubital <1 day                  Significant Labs:   Chemistries:   Recent Labs   Lab 06/07/25  2344 06/08/25 0147 06/08/25  0819     --   --    K 4.1  --   --      --   --    CO2 26  --   --    BUN 16  --   --    CREATININE 1.28*  --   --    CALCIUM 9.1  --   --    PROT 6.9  --   --    BILITOT 0.5  --   --    ALKPHOS 199*  --   --    ALT 41  --   --    AST 35  --   --    MG 2.10  --   --    TROPONINI 0.480* 0.536* 1.129*        CBC/Anemia Labs: Coags:    Recent Labs   Lab 06/07/25  2344 06/08/25  0421   WBC 5.98 6.82   HGB 14.3 14.5   HCT 39.4 40.2    144   MCV 87.2 87.2   RDW 12.9 12.9    Recent Labs   Lab 06/08/25  0147 06/08/25  0905   INR 0.9  --    APTT 25.4 36.6*        Significant Imaging:  Imaging Results              CTA Chest Non-Coronary (PE Studies) (Final result)  Result time 06/08/25 08:56:27      Final result by Blake Singh MD (06/08/25 08:56:27)                   Narrative:    EXAMINATION  CTA CHEST NON CORONARY (PE STUDIES)    CLINICAL HISTORY  Pulmonary embolism (PE) suspected, positive D-dimer;  dyspnea    TECHNIQUE  Post-contrast helical-acquisition CT images were obtained, with bolus timing to pulmonary arterial system for purposes of PE protocol CTA.  Multiplanar reconstructions, including MIP images, were accomplished by a CT technologist at a separate workstation, pushed to PACS for physician review.    CONTRAST  IV: Omnipaque 350, 89 mL    COMPARISON  None available at the time of initial interpretation.    FINDINGS  Images were reviewed in soft tissue, lung, and bone windows.    Exam quality: adequate for evaluation    Lines/tubes: none  visualized    Pulmonary Vessels: No central or large segmental filling defect.  Soft tissue density is appreciated at the right middle lobe pulmonary arterial vasculature, may represent airspace consolidation or perivascular adenopathy.    Cardiovascular: No suggestion of right heart strain; the RV:LV ratio is <1. No significant heart chamber enlargement. There is no pericardial effusion. No focal contour irregularity or intraluminal abnormality is appreciated involving the visualized aorta and branch vessels. Mediastinal postoperative changes suggestive of CABG incidentally noted.    Lungs/Pleura: Central airways are patent. No acute airspace consolidation or suspicious focal lesion. No pleural thickening, significant effusion, or evidence of pneumothorax.    Other Findings: Right perivascular lymph node is present adjacent to the ascending aorta, measuring 1.7 cm short axis.  There is also an enlarged right lower paratracheal node measuring 1.9 cm in least dimension.  Ovoid low-density nodule is present at the right thyroid lobe.  No convincing acute abnormality of the visualized upper abdomen.  No acute osseous displacement or destructive focal lesion.    IMPRESSION  1. No convincing central or segmental pulmonary thromboembolism.  2. Suspected perivascular adenopathy versus right middle lobe airspace consolidation, as well as multiple enlarged mediastinal lymph nodes.  This could be due to acute infectious process but neoplastic/metastatic etiology is not excluded.  3. Chronic secondary details discussed above.    RADIATION DOSE  Automated tube current modulation, weight-based exposure dosing, and/or iterative reconstruction technique utilized to reach lowest reasonably achievable exposure rate.    DLP: 226 mGy*cm      Electronically signed by: Blake Singh  Date:    06/08/2025  Time:    08:56                                  EKG:       Telemetry: SR/R BBB    Physical Exam  Constitutional:       General: He is  not in acute distress.     Appearance: Normal appearance. He is obese.   HENT:      Head: Normocephalic.      Mouth/Throat:      Mouth: Mucous membranes are moist.   Eyes:      Extraocular Movements: Extraocular movements intact.   Cardiovascular:      Rate and Rhythm: Normal rate and regular rhythm.      Pulses: Normal pulses.      Heart sounds: Normal heart sounds.   Pulmonary:      Effort: Pulmonary effort is normal. No respiratory distress.      Breath sounds: Normal breath sounds.   Abdominal:      Palpations: Abdomen is soft.   Skin:     General: Skin is warm and dry.   Neurological:      General: No focal deficit present.      Mental Status: He is alert and oriented to person, place, and time. Mental status is at baseline.   Psychiatric:         Mood and Affect: Mood normal.         Behavior: Behavior normal.       Home Medications:   Medications Ordered Prior to Encounter[1]  Current Schedule Inpatient Medications:   heparin (PORCINE)  43.9 Units/kg Intravenous Once     Continuous Infusions:   heparin (porcine) in D5W  0-40 Units/kg/hr Intravenous Continuous 13.7 mL/hr at 06/08/25 0943 15 Units/kg/hr at 06/08/25 0943     Assessment:   NSTEMI   CAD/CABG/PCI - Unknown Anatomy  Hx of MI  Tobacco Abuse  ETOH Abuse  Medical Therapy Non-Compliance  HLD  Dysarthria  On Eliquis as OP - Unknown Reason (No Clear Indication)  No Hx of GIB     Plan:   Obtain Records from [place] Cardiologist - Unknown Name  Trend Cardiac Biomarkers  ECHO Pending  Continue Heparin Drip per Protocol  Last Dose of Eliquis 6.7.25 at 2100 PM   Plan for LHC in 48 Hours from Last Dose of Eliquis which would Put the Study on 6.10.25  Start ASA 81mg PO Qday  Start Atorvastatin 40mg PO Qday  Plavix 300mg PO x 1 then 75mg PO Qday   Labs and EKG in AM: CBC, CMP and Mg    Thank you for your consult.     Jeremiah Edmondson, BRYAN  Cardiology  [facility]     Physician addendum:        Patient's cardiac care is performed  as a split-shared visit with COBY d/t complicated medical management as detailed in A/P and associated high acuity requiring physician expertise. I obtained and performed relevant components of history/exam. Medical decision-making is formulated by me. It is a pleasure to care for the patient.    Vidal Browning MD  Cardiology          [1]   Current Facility-Administered Medications on File Prior to Encounter   Medication Dose Route Frequency Provider Last Rate Last Admin    [COMPLETED] aspirin tablet 325 mg  325 mg Oral ED 1 Time Miguelangel Padilla DO   325 mg at 06/08/25 0029    [COMPLETED] famotidine tablet 20 mg  20 mg Oral ED 1 Time Miguelangel Padilla DO   20 mg at 06/08/25 0029    [COMPLETED] heparin 25,000 units in dextrose 5% (100 units/ml) IV bolus from bag LOW INTENSITY NOMOGRAM - [facility]  43.9 Units/kg Intravenous Once Miguelangel Padilla DO   4,000 Units at 06/08/25 0243    [DISCONTINUED] atorvastatin tablet 40 mg  40 mg Oral Daily [place], NP        [DISCONTINUED] heparin (porcine) injection 5,000 Units  5,000 Units Intravenous ED 1 Time Miguelangel Padilla DO        [DISCONTINUED] heparin 25,000 units in dextrose 5% (100 units/ml) IV bolus from bag LOW INTENSITY NOMOGRAM - [facility]  43.9 Units/kg Intravenous PRN Miguelangel Padilla, DO        [DISCONTINUED] heparin 25,000 units in dextrose 5% (100 units/ml) IV bolus from bag LOW INTENSITY NOMOGRAM - [facility]  30 Units/kg Intravenous PRN Miguelangel Padilla, DO        [DISCONTINUED] heparin 25,000 units in dextrose 5% 250 mL (100 units/mL) infusion LOW INTENSITY nomogram - [facility]  0-40 Units/kg/hr Intravenous Continuous Miguelangel Padilla, DO 10.9 mL/hr at 06/08/25 0311 12 Units/kg/hr at 06/08/25 0311    [DISCONTINUED] iohexoL (OMNIPAQUE 350) injection 70 mL  70 mL Intravenous ONCE PRN Miguelangel Padilla, DO         Current Outpatient Medications on File Prior to Encounter   Medication Sig Dispense Refill    apixaban (ELIQUIS) 5 mg  Tab Take 1 tablet (5 mg total) by mouth 2 (two) times daily. 180 tablet 3    atorvastatin (LIPITOR) 40 MG tablet Take 1 tablet (40 mg total) by mouth once daily. 90 tablet 3    pantoprazole (PROTONIX) 40 MG tablet Take 1 tablet (40 mg total) by mouth once daily. 90 tablet 3

## 2025-06-08 NOTE — H&P (VIEW-ONLY)
Inpatient consult to Cardiology  Consult performed by: Jeremiah Edmondson ANP  Consult ordered by: Melina Edmondson PA-C  Reason for consult: NSTEMI        [facility]    Cardiology  Consult Note    Patient Name: Francis Berman  MRN: [id number]  Admission Date: 6/8/2025  Hospital Length of Stay: 0 days  Code Status: Prior   Attending Provider: Nayan Lei MD   Consulting Provider: BRYAN Smith  Primary Care Physician: Luzmaria Cooper FNP  Principal Problem:<principal problem not specified>    Patient information was obtained from patient, past medical records, and ER records.     Subjective:     Chief Complaint/Reason for Consult: NSTEMI     HPI: Mr. Berman is a 64 y/o male who is unknown to [facility]. He has a history of coronary artery disease status post coronary artery bypass grafting hypercoagulable blood disorder currently on chronic anticoagulation, pulmonary embolus, hypertension and alcohol abuse. Since the ER this evening with complaints of chest discomfort that started about 2 hours prior to his arrival.  It was substernal pressure without radiation or shortness of breath.  KG done upon arrival shows a normal sinus rhythm with a right bundle branch block.  There are no previous EKGs to compare.  Chest x-ray shows some mild congestion.  Lab work showed a troponin of 0.480 and BNP was mildly elevated at 485. He underwent a Telecardiology Consult and it was recommended for transfer to a PCI capable facility. He was placed on Heparin and his Troponin did uptrend to > 1. [facility] was consutled for NSTEMI.     PMH: Dysarthria, MI, HLD, CAD  PSH: Sternotomy (Sternal Wires on XR)/Unknown Type, Angiogram  Family History: Mother, L, PAD, DM II; Father, DM II  Social History: 1.5 ppd Tobacco Use for 40+ Years; + ETOH Use; Denies Illicit Drug Use     Previous Cardiac Diagnostics: None Available for Review   Subjectively Reported:  CABG in the 90's  PCI stenting pre and post bypass  He is  unsure of when    Review of patient's allergies indicates:   Allergen Reactions    Promethazine Hallucinations    Sulfa (sulfonamide antibiotics)      Current Facility-Administered Medications on File Prior to Encounter   Medication    [COMPLETED] aspirin tablet 325 mg    [COMPLETED] famotidine tablet 20 mg    [COMPLETED] heparin 25,000 units in dextrose 5% (100 units/ml) IV bolus from bag LOW INTENSITY NOMOGRAM - [facility]    [DISCONTINUED] atorvastatin tablet 40 mg    [DISCONTINUED] heparin (porcine) injection 5,000 Units    [DISCONTINUED] heparin 25,000 units in dextrose 5% (100 units/ml) IV bolus from bag LOW INTENSITY NOMOGRAM - [facility]    [DISCONTINUED] heparin 25,000 units in dextrose 5% (100 units/ml) IV bolus from bag LOW INTENSITY NOMOGRAM - [facility]    [DISCONTINUED] heparin 25,000 units in dextrose 5% 250 mL (100 units/mL) infusion LOW INTENSITY nomogram - [facility]    [DISCONTINUED] iohexoL (OMNIPAQUE 350) injection 70 mL     Current Outpatient Medications on File Prior to Encounter   Medication Sig    apixaban (ELIQUIS) 5 mg Tab Take 1 tablet (5 mg total) by mouth 2 (two) times daily.    atorvastatin (LIPITOR) 40 MG tablet Take 1 tablet (40 mg total) by mouth once daily.    pantoprazole (PROTONIX) 40 MG tablet Take 1 tablet (40 mg total) by mouth once daily.     Review of Systems   Constitutional:  Positive for fatigue.   Respiratory:  Positive for shortness of breath.    Cardiovascular:  Positive for chest pain. Negative for palpitations and leg swelling.   All other systems reviewed and are negative.    Objective:     Vital Signs (Most Recent):  Temp: 97.7 °F (36.5 °C) (06/08/25 0738)  Pulse: 72 (06/08/25 0958)  Resp: 19 (06/08/25 0958)  BP: (!) 158/111 (06/08/25 0958)  SpO2: 98 % (06/08/25 0958) Vital Signs (24h Range):  Temp:  [97.7 °F (36.5 °C)-98 °F (36.7 °C)] 97.7 °F (36.5 °C)  Pulse:  [64-90] 72  Resp:  [10-19] 19  SpO2:  [95 %-99 %] 98 %  BP: (131-168)/() 158/111   Weight: 91.2 kg (201 lb 1 oz)  Body  mass index is 30.57 kg/m².  SpO2: 98 %     No intake or output data in the 24 hours ending 06/08/25 1122  Lines/Drains/Airways       Peripheral Intravenous Line  Duration                  Peripheral IV - Single Lumen 06/08/25 0819 20 G Anterior;Left;Proximal Forearm <1 day         Peripheral IV - Single Lumen 06/08/25 20 G Right Antecubital <1 day                  Significant Labs:   Chemistries:   Recent Labs   Lab 06/07/25  2344 06/08/25 0147 06/08/25  0819     --   --    K 4.1  --   --      --   --    CO2 26  --   --    BUN 16  --   --    CREATININE 1.28*  --   --    CALCIUM 9.1  --   --    PROT 6.9  --   --    BILITOT 0.5  --   --    ALKPHOS 199*  --   --    ALT 41  --   --    AST 35  --   --    MG 2.10  --   --    TROPONINI 0.480* 0.536* 1.129*        CBC/Anemia Labs: Coags:    Recent Labs   Lab 06/07/25  2344 06/08/25  0421   WBC 5.98 6.82   HGB 14.3 14.5   HCT 39.4 40.2    144   MCV 87.2 87.2   RDW 12.9 12.9    Recent Labs   Lab 06/08/25  0147 06/08/25  0905   INR 0.9  --    APTT 25.4 36.6*        Significant Imaging:  Imaging Results              CTA Chest Non-Coronary (PE Studies) (Final result)  Result time 06/08/25 08:56:27      Final result by Blake Singh MD (06/08/25 08:56:27)                   Narrative:    EXAMINATION  CTA CHEST NON CORONARY (PE STUDIES)    CLINICAL HISTORY  Pulmonary embolism (PE) suspected, positive D-dimer;  dyspnea    TECHNIQUE  Post-contrast helical-acquisition CT images were obtained, with bolus timing to pulmonary arterial system for purposes of PE protocol CTA.  Multiplanar reconstructions, including MIP images, were accomplished by a CT technologist at a separate workstation, pushed to PACS for physician review.    CONTRAST  IV: Omnipaque 350, 89 mL    COMPARISON  None available at the time of initial interpretation.    FINDINGS  Images were reviewed in soft tissue, lung, and bone windows.    Exam quality: adequate for evaluation    Lines/tubes: none  visualized    Pulmonary Vessels: No central or large segmental filling defect.  Soft tissue density is appreciated at the right middle lobe pulmonary arterial vasculature, may represent airspace consolidation or perivascular adenopathy.    Cardiovascular: No suggestion of right heart strain; the RV:LV ratio is <1. No significant heart chamber enlargement. There is no pericardial effusion. No focal contour irregularity or intraluminal abnormality is appreciated involving the visualized aorta and branch vessels. Mediastinal postoperative changes suggestive of CABG incidentally noted.    Lungs/Pleura: Central airways are patent. No acute airspace consolidation or suspicious focal lesion. No pleural thickening, significant effusion, or evidence of pneumothorax.    Other Findings: Right perivascular lymph node is present adjacent to the ascending aorta, measuring 1.7 cm short axis.  There is also an enlarged right lower paratracheal node measuring 1.9 cm in least dimension.  Ovoid low-density nodule is present at the right thyroid lobe.  No convincing acute abnormality of the visualized upper abdomen.  No acute osseous displacement or destructive focal lesion.    IMPRESSION  1. No convincing central or segmental pulmonary thromboembolism.  2. Suspected perivascular adenopathy versus right middle lobe airspace consolidation, as well as multiple enlarged mediastinal lymph nodes.  This could be due to acute infectious process but neoplastic/metastatic etiology is not excluded.  3. Chronic secondary details discussed above.    RADIATION DOSE  Automated tube current modulation, weight-based exposure dosing, and/or iterative reconstruction technique utilized to reach lowest reasonably achievable exposure rate.    DLP: 226 mGy*cm      Electronically signed by: Blake Singh  Date:    06/08/2025  Time:    08:56                                  EKG:       Telemetry: SR/R BBB    Physical Exam  Constitutional:       General: He is  not in acute distress.     Appearance: Normal appearance. He is obese.   HENT:      Head: Normocephalic.      Mouth/Throat:      Mouth: Mucous membranes are moist.   Eyes:      Extraocular Movements: Extraocular movements intact.   Cardiovascular:      Rate and Rhythm: Normal rate and regular rhythm.      Pulses: Normal pulses.      Heart sounds: Normal heart sounds.   Pulmonary:      Effort: Pulmonary effort is normal. No respiratory distress.      Breath sounds: Normal breath sounds.   Abdominal:      Palpations: Abdomen is soft.   Skin:     General: Skin is warm and dry.   Neurological:      General: No focal deficit present.      Mental Status: He is alert and oriented to person, place, and time. Mental status is at baseline.   Psychiatric:         Mood and Affect: Mood normal.         Behavior: Behavior normal.       Home Medications:   Medications Ordered Prior to Encounter[1]  Current Schedule Inpatient Medications:   heparin (PORCINE)  43.9 Units/kg Intravenous Once     Continuous Infusions:   heparin (porcine) in D5W  0-40 Units/kg/hr Intravenous Continuous 13.7 mL/hr at 06/08/25 0943 15 Units/kg/hr at 06/08/25 0943     Assessment:   NSTEMI   CAD/CABG/PCI - Unknown Anatomy  Hx of MI  Tobacco Abuse  ETOH Abuse  Medical Therapy Non-Compliance  HLD  Dysarthria  On Eliquis as OP - Unknown Reason (No Clear Indication)  No Hx of GIB     Plan:   Obtain Records from [place] Cardiologist - Unknown Name  Trend Cardiac Biomarkers  ECHO Pending  Continue Heparin Drip per Protocol  Last Dose of Eliquis 6.7.25 at 2100 PM   Plan for LHC in 48 Hours from Last Dose of Eliquis which would Put the Study on 6.10.25  Start ASA 81mg PO Qday  Start Atorvastatin 40mg PO Qday  Plavix 300mg PO x 1 then 75mg PO Qday   Labs and EKG in AM: CBC, CMP and Mg    Thank you for your consult.     Jeremiah Edmondson, BRYAN  Cardiology  [facility]     Physician addendum:        Patient's cardiac care is performed  as a split-shared visit with [facility]BY d/t complicated medical management as detailed in A/P and associated high acuity requiring physician expertise. I obtained and performed relevant components of history/exam. Medical decision-making is formulated by me. It is a pleasure to care for the patient.    Vidal Browning MD  Cardiology          [1]   Current Facility-Administered Medications on File Prior to Encounter   Medication Dose Route Frequency Provider Last Rate Last Admin    [COMPLETED] aspirin tablet 325 mg  325 mg Oral ED 1 Time Miguelangel Padilla DO   325 mg at 06/08/25 0029    [COMPLETED] famotidine tablet 20 mg  20 mg Oral ED 1 Time Miguelangel Padilla DO   20 mg at 06/08/25 0029    [COMPLETED] heparin 25,000 units in dextrose 5% (100 units/ml) IV bolus from bag LOW INTENSITY NOMOGRAM - [facility]  43.9 Units/kg Intravenous Once Miguelangel Padilla DO   4,000 Units at 06/08/25 0243    [DISCONTINUED] atorvastatin tablet 40 mg  40 mg Oral Daily [place], NP        [DISCONTINUED] heparin (porcine) injection 5,000 Units  5,000 Units Intravenous ED 1 Time Miguelangel Padilla DO        [DISCONTINUED] heparin 25,000 units in dextrose 5% (100 units/ml) IV bolus from bag LOW INTENSITY NOMOGRAM - [facility]  43.9 Units/kg Intravenous PRN Miguelangel Padilla, DO        [DISCONTINUED] heparin 25,000 units in dextrose 5% (100 units/ml) IV bolus from bag LOW INTENSITY NOMOGRAM - [facility]  30 Units/kg Intravenous PRN Miguelangel Padilla, DO        [DISCONTINUED] heparin 25,000 units in dextrose 5% 250 mL (100 units/mL) infusion LOW INTENSITY nomogram - [facility]  0-40 Units/kg/hr Intravenous Continuous Miguelangel Padilla, DO 10.9 mL/hr at 06/08/25 0311 12 Units/kg/hr at 06/08/25 0311    [DISCONTINUED] iohexoL (OMNIPAQUE 350) injection 70 mL  70 mL Intravenous ONCE PRN Miguelangel Padilla, DO         Current Outpatient Medications on File Prior to Encounter   Medication Sig Dispense Refill    apixaban (ELIQUIS) 5 mg  Tab Take 1 tablet (5 mg total) by mouth 2 (two) times daily. 180 tablet 3    atorvastatin (LIPITOR) 40 MG tablet Take 1 tablet (40 mg total) by mouth once daily. 90 tablet 3    pantoprazole (PROTONIX) 40 MG tablet Take 1 tablet (40 mg total) by mouth once daily. 90 tablet 3

## 2025-06-08 NOTE — CONSULTS
[facility]  [facility]    Patient Name: Francis Berman  MRN: [id number]  Admission Date: 6/7/2025  Hospital Length of Stay: 0 days  Code Status: Prior   Attending Provider: Miguelangel Padilla DO   Consulting Provider: Trent Lake NP  Primary Care Physician: Luzmaria Cooper FNP  Principal Problem:<principal problem not specified>    Patient information was obtained from patient, past medical records, ER records, and primary team.     Consults  Subjective:     Chief Complaint:    Telecardiology Consult  [facility]  Dr Padilla  CAD, > trop  > 30 minutes including review of medical records, provider and patient interview    HPI:   65-year-old male unknown to our services.  He has a history of coronary artery disease status post coronary artery bypass grafting hypercoagulable blood disorder currently on chronic anticoagulation, pulmonary embolus, hypertension and alcohol abuse.    Since the ER this evening with complaints of chest discomfort that started about 2 hours prior to his arrival.  It was substernal pressure without radiation or shortness of breath.  KG done upon arrival shows a normal sinus rhythm with a right bundle branch block.  There are no previous EKGs to compare.  Chest x-ray shows some mild congestion.  Lab work showed a troponin of 0.480 and BNP was mildly elevated at 485    CABG in the 90's  PCI stenting pre and post bypass  He is unsure of when    Past Medical History:   Diagnosis Date    Dysarthria 06/07/2024    History of heart attack     History of open heart surgery     Hyperlipidemia        Past Surgical History:   Procedure Laterality Date    ANGIOPLASTY  1990       Review of patient's allergies indicates:   Allergen Reactions    Promethazine Hallucinations    Sulfa (sulfonamide antibiotics)        No current facility-administered medications on file prior to encounter.     Current Outpatient Medications on File Prior to Encounter   Medication  Sig    apixaban (ELIQUIS) 5 mg Tab Take 1 tablet (5 mg total) by mouth 2 (two) times daily.    atorvastatin (LIPITOR) 40 MG tablet Take 1 tablet (40 mg total) by mouth once daily.    pantoprazole (PROTONIX) 40 MG tablet Take 1 tablet (40 mg total) by mouth once daily.     Family History       Problem Relation (Age of Onset)    Diabetes Mother, Father    Lung cancer Sister    Pancreatic cancer Sister    Peripheral vascular disease Mother          Tobacco Use    Smoking status: Every Day     Average packs/day: 1.5 packs/day for 40.4 years (60.6 ttl pk-yrs)     Types: Cigarettes     Start date: 1984    Smokeless tobacco: Never   Substance and Sexual Activity    Alcohol use: Yes    Drug use: Not on file    Sexual activity: Not on file     Review of Systems   Constitutional: Negative.   HENT:  Positive for congestion and sore throat.    Eyes: Negative.    Cardiovascular:  Positive for chest pain. Negative for dyspnea on exertion.   Respiratory:  Positive for shortness of breath.    Endocrine: Negative.    Musculoskeletal: Negative.      Objective:     Vital Signs (Most Recent):  Temp: 97.7 °F (36.5 °C) (06/07/25 2335)  Pulse: 83 (06/08/25 0105)  Resp: 17 (06/08/25 0105)  BP: (!) 154/90 (06/08/25 0105)  SpO2: 96 % (06/08/25 0105) Vital Signs (24h Range):  Temp:  [97.7 °F (36.5 °C)] 97.7 °F (36.5 °C)  Pulse:  [82-90] 83  Resp:  [14-17] 17  SpO2:  [96 %] 96 %  BP: (131-154)/(81-92) 154/90     Weight: 91.2 kg (201 lb)  Body mass index is 30.56 kg/m².    SpO2: 96 %       No intake or output data in the 24 hours ending 06/08/25 0145    Lines/Drains/Airways       Peripheral Intravenous Line  Duration                  Peripheral IV - Single Lumen 06/07/25 2549 20 G Right Antecubital <1 day                    Physical Exam  Constitutional:       Appearance: Normal appearance.   HENT:      Head: Normocephalic.      Nose: Nose normal.      Mouth/Throat:      Mouth: Mucous membranes are moist.   Eyes:      Extraocular Movements:  Extraocular movements intact.   Cardiovascular:      Rate and Rhythm: Normal rate and regular rhythm.   Pulmonary:      Effort: Pulmonary effort is normal.      Breath sounds: Normal breath sounds.   Abdominal:      General: Abdomen is flat. Bowel sounds are normal.   Musculoskeletal:         General: Normal range of motion.      Cervical back: Normal range of motion.   Skin:     General: Skin is warm and dry.   Neurological:      General: No focal deficit present.      Mental Status: He is alert.   Psychiatric:         Mood and Affect: Mood normal.         Behavior: Behavior normal.         Significant Labs:   Recent Lab Results         06/07/25  2344        Albumin/Globulin Ratio 1.3       Albumin 3.9              ALT 41       Anion Gap 3.0       AST 35       Baso # 0.03       Basophil % 0.5       BILIRUBIN TOTAL 0.5       BUN 16       BUN/CREAT RATIO 13       Calcium 9.1       Chloride 107       CO2 26       Creatinine 1.28       eGFR 61  Comment:                      EGFR INTERPRETATION    Beginning 8/15/22 we are reporting the eGFRcr calculation as recommended by the [employer]. The eGFRcr equation has similar overall performance characteristics to the older equation, but the values may differ by more than 10% particularly at higher values of eGFRcr and younger adult ages.    NKF stages of chronic kidney disease (CKD)  Stage 1: Kidney damage with normal or increased eGFR (>90 mL/min/1.73 m^2)  Stage 2: Mild reduction in GFR (60-89 mL/min/1.73 m^2)  Stage 3a: Moderate reduction in GFR (45-59 mL/min/1.73 m^2)  Stage 3b: Moderate reduction in GFR (30-44 mL/min/1.73 m^2)  Stage 4: Severe reduction in GFR (15-29 mL/min/1.73 m^2)  Stage 5: Kidney failure (GFR <15 mL/min/1.73 m^2)      Estimated GFR calculated using the CKD-EPI creatinine (2021) equation.       Eos # 0.16       Eos % 2.7       Globulin, Total 3.0       Glucose 158       Hematocrit 39.4       Hemoglobin 14.3       Immature Grans  "(Abs) 0.04       Immature Granulocytes 0.7       Lymph # 1.11       LYMPH % 18.6       Magnesium  2.10       MCH 31.6       MCHC 36.3       MCV 87.2       Mono # 0.47       Mono % 7.9       MPV 8.0       Neut # 4.17       Neut % 69.6       nRBC 0.0       Platelet Count 141       Potassium 4.1       ProBNP 485.0  Comment:   For ambulatory patients presenting to outpatient facilities with clinical suspicion of HF not previously diagnosed and at least one sign, symptom or risk factor for HF, NT-proBNP II test results should be interpreted as indicated below:    <125(pg/mL):"Negative: Heart Failure Unlikely"    >=125(pg/mL):"Consider Heart Failure as well as other causes of NT-proBNP elevation"             PROTEIN TOTAL 6.9       RBC 4.52       RDW 12.9       Sodium 136       Troponin I 0.480       WBC 5.98               Significant Imaging:   EKG: NSR RBBB no previous to compare  CXR: Mild congestion  Assessment and Plan:     Impression:  Chest discomfort  Elevated troponin  -History coronary bypass grafting  -Right bundle branch block on EKG  Coronary artery disease  -History coronary artery bypass grafting  History of PE  -Hypercoagulable state  -On Eliquis twice daily  Hypertension  Smoker    Plan:  He has a history of coronary artery disease and coronary artery bypass grafting with PCI and stenting pre and post bypass.  He saw Dr. Segovia a year ago  Due to his recurrent chest discomfort, coronary artery disease history an elevated troponin we should start him on a heparin bolus and drip   Hold his Eliquis  Nitropaste 1 in to chest wall   Agree with aspirin   Transfer him to a facility where Interventional Cardiology is available  He will need to be an ER to ER transfer  We can admit him to our service after he is evaluated in the ER    Thank you for the consultation should you have any further questions or concerns please do not hesitate to call    There are no hospital problems to display for this " patient.      VTE Risk Mitigation (From admission, onward)      None            Thank you for your consult.     Trent Lake NP  Cardiology   [facility]

## 2025-06-08 NOTE — Clinical Note
The catheter was inserted into the left ventricle. Hemodynamics were performed.  and Pullback was recorded.  An angiography was performed of the LV. The angiography was performed via power injection.  [place]

## 2025-06-08 NOTE — ED PROVIDER NOTES
Encounter Date: 6/7/2025       History     Chief Complaint   Patient presents with    Chest Pain     C/O CHEST PAIN X 2 HOURS     This is a 65-year-old male patient came into the emergency room with history of having chest pain for last 2 hours.  Reports it is in the substernal area moderate intensity achy type no specific aggravating or relieving factors.  No radiation of the pain to the left arm or neck.  Denies any shortness of breath or palpitations.  Does have history of pulmonary embolus, diabetes, dyslipidemia, smoking, CAD, coronary stents, CABG.  Patient does take Eliquis p.o. b.i.d..  No shortness of breath or palpitations.        Review of patient's allergies indicates:   Allergen Reactions    Promethazine Hallucinations    Sulfa (sulfonamide antibiotics)      Past Medical History:   Diagnosis Date    Dysarthria 06/07/2024    History of heart attack     History of open heart surgery     Hyperlipidemia      Past Surgical History:   Procedure Laterality Date    ANGIOPLASTY  1990     Family History   Problem Relation Name Age of Onset    Peripheral vascular disease Mother      Diabetes Mother      Diabetes Father      Lung cancer Sister      Pancreatic cancer Sister       Social History[1]  Review of Systems   Constitutional: Negative.  Negative for fatigue and fever.   HENT:  Negative for dental problem, drooling, ear discharge and ear pain.    Eyes: Negative.  Negative for pain and redness.   Respiratory: Negative.  Negative for cough and shortness of breath.    Cardiovascular: Negative.  Negative for chest pain, palpitations and leg swelling.   Gastrointestinal:  Negative for diarrhea, nausea and vomiting.   Endocrine: Negative.    Genitourinary:  Negative for dysuria, enuresis, flank pain and frequency.   Musculoskeletal:  Negative for gait problem, joint swelling, myalgias and neck pain.   Skin:  Negative for rash.   Neurological:  Negative for dizziness, seizures, speech difficulty, light-headedness  and numbness.   Psychiatric/Behavioral:  Negative for agitation and decreased concentration.    All other systems reviewed and are negative.      Physical Exam     Initial Vitals [06/07/25 2335]   BP Pulse Resp Temp SpO2   138/81 90 15 97.7 °F (36.5 °C) 96 %      MAP       --         Physical Exam    Nursing note and vitals reviewed.  Constitutional: He appears well-developed and well-nourished.   HENT:   Head: Normocephalic and atraumatic.   Eyes: Conjunctivae and EOM are normal. Pupils are equal, round, and reactive to light.   Neck: Neck supple.   Normal range of motion.  Cardiovascular:  Normal rate, regular rhythm, normal heart sounds and intact distal pulses.           Pulmonary/Chest: Breath sounds normal.   Abdominal: Abdomen is soft. Bowel sounds are normal. There is no abdominal tenderness. There is no rebound and no guarding.   Musculoskeletal:         General: Normal range of motion.      Cervical back: Normal range of motion and neck supple.     Neurological: He is alert and oriented to person, place, and time. He has normal strength. GCS score is 15. GCS eye subscore is 4. GCS verbal subscore is 5. GCS motor subscore is 6.   Skin: Skin is warm and dry. Capillary refill takes less than 2 seconds.   Psychiatric: He has a normal mood and affect. His behavior is normal.         ED Course   Procedures  Labs Reviewed   COMPREHENSIVE METABOLIC PANEL - Abnormal       Result Value    Sodium 136      Potassium 4.1      Chloride 107      CO2 26      Glucose 158 (*)     Blood Urea Nitrogen 16      Creatinine 1.28 (*)     Calcium 9.1      Protein Total 6.9      Albumin 3.9      Globulin 3.0      Albumin/Globulin Ratio 1.3      Bilirubin Total 0.5       (*)     ALT 41      AST 35      eGFR 61      Anion Gap 3.0      BUN/Creatinine Ratio 13     TROPONIN I - Abnormal    Troponin-I 0.480 (*)    NT-PRO NATRIURETIC PEPTIDE - Abnormal    ProBNP 485.0 (*)    CBC WITH DIFFERENTIAL - Abnormal    WBC 5.98      RBC 4.52       Hgb 14.3      Hct 39.4      MCV 87.2      MCH 31.6      MCHC 36.3      RDW 12.9      Platelet 141      MPV 8.0 (*)     Neut % 69.6      Lymph % 18.6 (*)     Mono % 7.9      Eos % 2.7      Basophil % 0.5      Lymph # 1.11 (*)     Neut # 4.17      Mono # 0.47      Eos # 0.16      Baso # 0.03      Imm Gran # 0.04 (*)     Imm Grans % 0.7 (*)     NRBC% 0.0     MAGNESIUM - Normal    Magnesium Level 2.10     CBC W/ AUTO DIFFERENTIAL    Narrative:     The following orders were created for panel order CBC auto differential.  Procedure                               Abnormality         Status                     ---------                               -----------         ------                     CBC with Differential[[id number]]       Abnormal            Final result                 Please view results for these tests on the individual orders.   TROPONIN I   D DIMER, QUANTITATIVE   PROTIME-INR     EKG Readings: (Independently Interpreted)   EKG shows normal sinus rhythm with heart rate of 91, DE interval 136 QRS duration 126  milliseconds.    Has right bundle-branch block.       Imaging Results              X-Ray Chest 1 View (In process)                   X-Rays:   Independently Interpreted Readings:   Other Readings:  Chest x-ray shows vascular congestion.    Medications   atorvastatin tablet 40 mg (has no administration in time range)   aspirin tablet 325 mg (325 mg Oral Given 6/8/25 0029)   famotidine tablet 20 mg (20 mg Oral Given 6/8/25 0029)     Medical Decision Making  This is a 65-year-old male patient came into the emergency room with history of having chest pain for last 2 hours.  Reports it is in the substernal area moderate intensity achy type no specific aggravating or relieving factors.  No radiation of the pain to the left arm or neck.  Denies any shortness of breath or palpitations.  Does have history of pulmonary embolus, diabetes, dyslipidemia, smoking, CAD, coronary stents, CABG.  Patient does  take Eliquis p.o. b.i.d..  No shortness of breath or palpitations.    Differential diagnosis: 1.  Costochondritis 2. NSTEMI 3. Elevated troponin 4. STEMI 5. Pulmonary embolus    Patient has been seen by tele cardiologist and has recommended to start the patient on heparin bolus followed by heparin drip.  Recommended to transfer the patient for higher level care.    Amount and/or Complexity of Data Reviewed  Labs: ordered.  Radiology: ordered.    Risk  OTC drugs.                                      Clinical Impression:  Final diagnoses:  [R07.9] Chest pain  [R07.9] Chest pain, unspecified type (Primary)  [R79.89] Elevated troponin  [Z95.1] History of coronary artery bypass graft  [Z86.79] History of CAD (coronary artery disease)  [Z79.01] Current use of anticoagulant therapy          ED Disposition Condition    Transfer to Another Facility Stable                    Miguelangel Padilla DO  06/08/25 0208         [1]   Social History  Tobacco Use    Smoking status: Every Day     Average packs/day: 1.5 packs/day for 40.4 years (60.6 ttl pk-yrs)     Types: Cigarettes     Start date: 1984    Smokeless tobacco: Never   Substance Use Topics    Alcohol use: Yes        Miguelangel Padilla DO  06/08/25 0210

## 2025-06-08 NOTE — Clinical Note
The catheter was repositioned into the ostial SVG graft. An angiography was performed of the graft. The angiography was performed via power injection.  SVG-OM occluded

## 2025-06-08 NOTE — ED PROVIDER NOTES
Encounter Date: 6/8/2025    SCRIBE #1 NOTE: I, Ana Magdaleno, kristal scribing for, and in the presence of,  Jorgito Clemons III, MD. I have scribed the following portions of the note - Other sections scribed: HPI, ROS, PE.       History     Chief Complaint   Patient presents with    Transfer     Transfer from [facility] for NSTEMI. Pt c/o chest pain that started a few hours prior to arrival at [facility]. Also reports cough and congestion. Denies pain currently. Hx of CAD. Heparin infusing.      Patient is a 65 year old male with a past medical history of pulmonary embolism on eliquis, diabetes, CAD s/p coronary stents and CABG presenting to the ED via EMS as a transfer from [facility] with NSTEMI diagnosis for cardiology services. Patient reports sore throat, postnasal drip, wheezes, and productive cough with green phlegm onset yesterday. He endorses chest pain. Patient denies nausea or vomiting. Patient does smoke.     The history is provided by the patient and medical records. No  was used.     Review of patient's allergies indicates:   Allergen Reactions    Promethazine Hallucinations    Sulfa (sulfonamide antibiotics)      Past Medical History:   Diagnosis Date    Dysarthria 06/07/2024    History of heart attack     History of open heart surgery     Hyperlipidemia      Past Surgical History:   Procedure Laterality Date    ANGIOPLASTY  1990     Family History   Problem Relation Name Age of Onset    Peripheral vascular disease Mother      Diabetes Mother      Diabetes Father      Lung cancer Sister      Pancreatic cancer Sister       Social History[1]  Review of Systems   Constitutional:  Negative for fatigue, fever and unexpected weight change.   HENT:  Positive for postnasal drip and sore throat. Negative for congestion and rhinorrhea.    Eyes:  Negative for pain.   Respiratory:  Positive for cough and wheezing. Negative for chest tightness and shortness of breath.    Cardiovascular:  Positive for  chest pain.   Gastrointestinal:  Negative for abdominal pain, constipation, diarrhea, nausea and vomiting.   Genitourinary:  Negative for dysuria.   Musculoskeletal:  Negative for back pain and neck pain.   Skin:  Negative for rash.   Allergic/Immunologic: Negative for environmental allergies, food allergies and immunocompromised state.   Neurological:  Negative for dizziness and speech difficulty.   Hematological:  Does not bruise/bleed easily.   Psychiatric/Behavioral:  Negative for sleep disturbance and suicidal ideas.        Physical Exam     Initial Vitals [06/08/25 0738]   BP Pulse Resp Temp SpO2   137/85 74 12 97.7 °F (36.5 °C) 99 %      MAP       --         Physical Exam    Nursing note and vitals reviewed.  Constitutional: He appears well-developed and well-nourished. No distress.   HENT:   Head: Normocephalic and atraumatic.   Eyes: EOM are normal. Pupils are equal, round, and reactive to light.   Neck: Trachea normal.   Normal range of motion.  Cardiovascular:  Normal rate, regular rhythm, normal heart sounds and intact distal pulses.           No murmur heard.  Pulmonary/Chest: No respiratory distress. He has rales (mild).   Well-healed midline sternotomy scar   Abdominal: Abdomen is soft. Bowel sounds are normal. He exhibits no distension. There is no abdominal tenderness.   Musculoskeletal:         General: Normal range of motion.      Cervical back: Normal range of motion.      Lumbar back: Normal.     Neurological: He is alert and oriented to person, place, and time. He has normal strength. No cranial nerve deficit. GCS score is 15. GCS eye subscore is 4. GCS verbal subscore is 5. GCS motor subscore is 6.   Skin: Skin is warm and dry. Capillary refill takes less than 2 seconds. No rash noted.   Psychiatric: He has a normal mood and affect. Judgment normal.         ED Course   Critical Care    Date/Time: 6/8/2025 11:33 AM    Performed by: Jorgito Clemons III, MD  Authorized by: Jorgito Clemons III,  MD  Direct patient critical care time: 45 minutes  Documentation critical care time: 5 minutes  Consulting other physicians critical care time: 5 minutes  Total critical care time (exclusive of procedural time) : 55 minutes  Critical care was necessary to treat or prevent imminent or life-threatening deterioration of the following conditions: metabolic crisis.  Critical care was time spent personally by me on the following activities: discussions with primary provider, discussions with consultants, examination of patient, re-evaluation of patient's condition, review of old charts, ordering and review of laboratory studies and ordering and performing treatments and interventions.        Labs Reviewed   TROPONIN I - Abnormal       Result Value    Troponin-I 1.129 (*)    APTT - Abnormal    PTT 36.6 (*)    COVID/RSV/FLU A&B PCR - Normal    Influenza A PCR Not Detected      Influenza B PCR Not Detected      Respiratory Syncytial Virus PCR Not Detected      SARS-CoV-2 PCR Not Detected      Narrative:     The Xpert Xpress SARS-CoV-2/FLU/RSV plus is a rapid, multiplexed real-time PCR test intended for the simultaneous qualitative detection and differentiation of SARS-CoV-2, Influenza A, Influenza B, and respiratory syncytial virus (RSV) viral RNA in either nasopharyngeal swab or nasal swab specimens.         LACTIC ACID, PLASMA - Normal    Lactic Acid Level 1.5     BLOOD CULTURE OLG   BLOOD CULTURE OLG          Imaging Results              CTA Chest Non-Coronary (PE Studies) (Final result)  Result time 06/08/25 08:56:27      Final result by Blake Singh MD (06/08/25 08:56:27)                   Narrative:    EXAMINATION  CTA CHEST NON CORONARY (PE STUDIES)    CLINICAL HISTORY  Pulmonary embolism (PE) suspected, positive D-dimer;  dyspnea    TECHNIQUE  Post-contrast helical-acquisition CT images were obtained, with bolus timing to pulmonary arterial system for purposes of PE protocol CTA.  Multiplanar reconstructions,  including MIP images, were accomplished by a CT technologist at a separate workstation, pushed to PACS for physician review.    CONTRAST  IV: Omnipaque 350, 89 mL    COMPARISON  None available at the time of initial interpretation.    FINDINGS  Images were reviewed in soft tissue, lung, and bone windows.    Exam quality: adequate for evaluation    Lines/tubes: none visualized    Pulmonary Vessels: No central or large segmental filling defect.  Soft tissue density is appreciated at the right middle lobe pulmonary arterial vasculature, may represent airspace consolidation or perivascular adenopathy.    Cardiovascular: No suggestion of right heart strain; the RV:LV ratio is <1. No significant heart chamber enlargement. There is no pericardial effusion. No focal contour irregularity or intraluminal abnormality is appreciated involving the visualized aorta and branch vessels. Mediastinal postoperative changes suggestive of CABG incidentally noted.    Lungs/Pleura: Central airways are patent. No acute airspace consolidation or suspicious focal lesion. No pleural thickening, significant effusion, or evidence of pneumothorax.    Other Findings: Right perivascular lymph node is present adjacent to the ascending aorta, measuring 1.7 cm short axis.  There is also an enlarged right lower paratracheal node measuring 1.9 cm in least dimension.  Ovoid low-density nodule is present at the right thyroid lobe.  No convincing acute abnormality of the visualized upper abdomen.  No acute osseous displacement or destructive focal lesion.    IMPRESSION  1. No convincing central or segmental pulmonary thromboembolism.  2. Suspected perivascular adenopathy versus right middle lobe airspace consolidation, as well as multiple enlarged mediastinal lymph nodes.  This could be due to acute infectious process but neoplastic/metastatic etiology is not excluded.  3. Chronic secondary details discussed above.    RADIATION DOSE  Automated tube  current modulation, weight-based exposure dosing, and/or iterative reconstruction technique utilized to reach lowest reasonably achievable exposure rate.    DLP: 226 mGy*cm      Electronically signed by: Blake Singh  Date:    06/08/2025  Time:    08:56                                     Medications   heparin 25,000 units in dextrose 5% (100 units/ml) IV bolus from bag LOW INTENSITY nomogram - LAF (4,000 Units Intravenous Not Given 6/8/25 0800)   heparin 25,000 units in dextrose 5% 250 mL (100 units/mL) infusion LOW INTENSITY nomogram - LAF (15 Units/kg/hr × 91.2 kg Intravenous New Bag 6/8/25 0943)   heparin 25,000 units in dextrose 5% (100 units/ml) IV bolus from bag LOW INTENSITY nomogram - LAF (4,000 Units Intravenous Bolus from Bag 6/8/25 0945)   heparin 25,000 units in dextrose 5% (100 units/ml) IV bolus from bag LOW INTENSITY nomogram - LAF (has no administration in time range)   hydrALAZINE injection 20 mg (has no administration in time range)   ondansetron injection 4 mg (has no administration in time range)   acetaminophen tablet 650 mg (has no administration in time range)   aspirin EC tablet 81 mg (has no administration in time range)   clopidogreL tablet 300 mg (has no administration in time range)   clopidogreL tablet 75 mg (has no administration in time range)   albuterol-ipratropium 2.5 mg-0.5 mg/3 mL nebulizer solution 3 mL (3 mLs Nebulization Given 6/8/25 0811)   iohexoL (OMNIPAQUE 350) injection 89 mL (89 mLs Intravenous Given 6/8/25 0843)   cefTRIAXone injection 2 g (2 g Intravenous Given 6/8/25 0934)   azithromycin (ZITHROMAX) 500 mg in 0.9% NaCl 250 mL IVPB (admixture device) (0 mg Intravenous Stopped 6/8/25 1036)   nitroGLYCERIN 2% TD oint ointment 1 inch (1 inch Transdermal Given 6/8/25 0947)     Medical Decision Making  The differential diagnosis includes, but is not limited to, bronchitis, pneumonia, acute coronary syndrome    Patient with cough and congestion does have a history of PE is  on oral anticoagulants.  A CT chest was done to rule out PE given patient having an elevated D-dimer.  It revealed inflammatory changes consistent with pneumonia.  Patient was given Rocephin and Zithromax for community-acquired pneumonia.  Patient with increasing troponin no active chest pain discussed case with cardiology also will see in consult recommending continuing aspirin and heparin    Problems Addressed:  NSTEMI (non-ST elevated myocardial infarction): complicated acute illness or injury that poses a threat to life or bodily functions  Pneumonia due to infectious organism, unspecified laterality, unspecified part of lung: complicated acute illness or injury that poses a threat to life or bodily functions    Amount and/or Complexity of Data Reviewed  Labs: ordered.  Radiology: ordered.    Risk  Prescription drug management.  Decision regarding hospitalization.            Scribe Attestation:   Scribe #1: I performed the above scribed service and the documentation accurately describes the services I performed. I attest to the accuracy of the note.    Attending Attestation:           Physician Attestation for Scribe:  Physician Attestation Statement for Scribe #1: I, Jorgito Clemons III, MD, reviewed documentation, as scribed by Ana Magdaleno in my presence, and it is both accurate and complete.             ED Course as of 06/08/25 1134   [place] Jun 08, 2025   0906 Paged hospitalist [AB]   [phone] Paged cardiology [AB]   8064 Discussed case with cardiology continue aspirin and [FK]      ED Course User Index  [AB] Ana Magdaleno  [FK] Jorgito Clemons III, MD                           Clinical Impression:  Final diagnoses:  [R07.9] Chest pain  [J18.9] Pneumonia due to infectious organism, unspecified laterality, unspecified part of lung (Primary)  [I21.4] NSTEMI (non-ST elevated myocardial infarction)          ED Disposition Condition    Admit                       [1]   Social History  Tobacco Use    Smoking  status: Every Day     Average packs/day: 1.5 packs/day for 40.4 years (60.6 ttl pk-yrs)     Types: Cigarettes     Start date: 1984    Smokeless tobacco: Never   Substance Use Topics    Alcohol use: Yes        Jorgito Clemons III, MD  06/08/25 1135

## 2025-06-09 LAB
ALBUMIN SERPL-MCNC: 3.2 G/DL (ref 3.4–4.8)
ALBUMIN/GLOB SERPL: 0.9 RATIO (ref 1.1–2)
ALP SERPL-CCNC: 147 UNIT/L (ref 40–150)
ALT SERPL-CCNC: 32 UNIT/L (ref 0–55)
ANION GAP SERPL CALC-SCNC: 10 MEQ/L
APTT PPP: 74.1 SECONDS (ref 23.2–33.7)
APTT PPP: 75 SECONDS (ref 23.2–33.7)
APTT PPP: 78.2 SECONDS (ref 23.2–33.7)
AST SERPL-CCNC: 24 UNIT/L (ref 11–45)
BASOPHILS # BLD AUTO: 0.03 X10(3)/MCL
BASOPHILS NFR BLD AUTO: 0.6 %
BILIRUB SERPL-MCNC: 0.5 MG/DL
BUN SERPL-MCNC: 12.2 MG/DL (ref 8.4–25.7)
CALCIUM SERPL-MCNC: 8.9 MG/DL (ref 8.8–10)
CATH EF QUANTITATIVE: 40 %
CHLORIDE SERPL-SCNC: 102 MMOL/L (ref 98–107)
CHOLEST SERPL-MCNC: 179 MG/DL
CHOLEST/HDLC SERPL: 5 {RATIO} (ref 0–5)
CO2 SERPL-SCNC: 26 MMOL/L (ref 23–31)
CREAT SERPL-MCNC: 1.16 MG/DL (ref 0.72–1.25)
CREAT/UREA NIT SERPL: 11
EOSINOPHIL # BLD AUTO: 0.16 X10(3)/MCL (ref 0–0.9)
EOSINOPHIL NFR BLD AUTO: 3.2 %
ERYTHROCYTE [DISTWIDTH] IN BLOOD BY AUTOMATED COUNT: 12.9 % (ref 11.5–17)
EST. AVERAGE GLUCOSE BLD GHB EST-MCNC: 105.4 MG/DL
GFR SERPLBLD CREATININE-BSD FMLA CKD-EPI: >60 ML/MIN/1.73/M2
GLOBULIN SER-MCNC: 3.4 GM/DL (ref 2.4–3.5)
GLUCOSE SERPL-MCNC: 98 MG/DL (ref 82–115)
HBA1C MFR BLD: 5.3 %
HCT VFR BLD AUTO: 39.8 % (ref 42–52)
HDLC SERPL-MCNC: 33 MG/DL (ref 35–60)
HGB BLD-MCNC: 14 G/DL (ref 14–18)
IMM GRANULOCYTES # BLD AUTO: 0.03 X10(3)/MCL (ref 0–0.04)
IMM GRANULOCYTES NFR BLD AUTO: 0.6 %
LDLC SERPL CALC-MCNC: 113 MG/DL (ref 50–140)
LYMPHOCYTES # BLD AUTO: 0.92 X10(3)/MCL (ref 0.6–4.6)
LYMPHOCYTES NFR BLD AUTO: 18.2 %
MAGNESIUM SERPL-MCNC: 2 MG/DL (ref 1.6–2.6)
MCH RBC QN AUTO: 31.3 PG (ref 27–31)
MCHC RBC AUTO-ENTMCNC: 35.2 G/DL (ref 33–36)
MCV RBC AUTO: 88.8 FL (ref 80–94)
MONOCYTES # BLD AUTO: 0.35 X10(3)/MCL (ref 0.1–1.3)
MONOCYTES NFR BLD AUTO: 6.9 %
NEUTROPHILS # BLD AUTO: 3.57 X10(3)/MCL (ref 2.1–9.2)
NEUTROPHILS NFR BLD AUTO: 70.5 %
NRBC BLD AUTO-RTO: 0 %
OHS QRS DURATION: 122 MS
OHS QRS DURATION: 126 MS
OHS QTC CALCULATION: 476 MS
OHS QTC CALCULATION: 494 MS
PLATELET # BLD AUTO: 141 X10(3)/MCL (ref 130–400)
PMV BLD AUTO: 8.1 FL (ref 7.4–10.4)
POTASSIUM SERPL-SCNC: 4.1 MMOL/L (ref 3.5–5.1)
PROT SERPL-MCNC: 6.6 GM/DL (ref 5.8–7.6)
RBC # BLD AUTO: 4.48 X10(6)/MCL (ref 4.7–6.1)
SODIUM SERPL-SCNC: 138 MMOL/L (ref 136–145)
TRIGL SERPL-MCNC: 164 MG/DL (ref 34–140)
TSH SERPL-ACNC: 1.06 UIU/ML (ref 0.35–4.94)
VLDLC SERPL CALC-MCNC: 33 MG/DL
WBC # BLD AUTO: 5.06 X10(3)/MCL (ref 4.5–11.5)

## 2025-06-09 PROCEDURE — 21400001 HC TELEMETRY ROOM

## 2025-06-09 PROCEDURE — 84443 ASSAY THYROID STIM HORMONE: CPT | Performed by: NURSE PRACTITIONER

## 2025-06-09 PROCEDURE — 63600175 PHARM REV CODE 636 W HCPCS: Performed by: EMERGENCY MEDICINE

## 2025-06-09 PROCEDURE — C1887 CATHETER, GUIDING: HCPCS | Performed by: INTERNAL MEDICINE

## 2025-06-09 PROCEDURE — 85025 COMPLETE CBC W/AUTO DIFF WBC: CPT | Performed by: NURSE PRACTITIONER

## 2025-06-09 PROCEDURE — 25000003 PHARM REV CODE 250: Performed by: PHYSICIAN ASSISTANT

## 2025-06-09 PROCEDURE — 85730 THROMBOPLASTIN TIME PARTIAL: CPT | Performed by: HOSPITALIST

## 2025-06-09 PROCEDURE — B2111ZZ FLUOROSCOPY OF MULTIPLE CORONARY ARTERIES USING LOW OSMOLAR CONTRAST: ICD-10-PCS | Performed by: INTERNAL MEDICINE

## 2025-06-09 PROCEDURE — 92920 PRQ TRLUML C ANGIOP 1ART&/BR: CPT | Mod: LD | Performed by: INTERNAL MEDICINE

## 2025-06-09 PROCEDURE — 63600175 PHARM REV CODE 636 W HCPCS: Performed by: INTERNAL MEDICINE

## 2025-06-09 PROCEDURE — 36415 COLL VENOUS BLD VENIPUNCTURE: CPT | Performed by: HOSPITALIST

## 2025-06-09 PROCEDURE — 02703ZZ DILATION OF CORONARY ARTERY, ONE ARTERY, PERCUTANEOUS APPROACH: ICD-10-PCS | Performed by: INTERNAL MEDICINE

## 2025-06-09 PROCEDURE — 99152 MOD SED SAME PHYS/QHP 5/>YRS: CPT | Performed by: INTERNAL MEDICINE

## 2025-06-09 PROCEDURE — C1760 CLOSURE DEV, VASC: HCPCS | Performed by: INTERNAL MEDICINE

## 2025-06-09 PROCEDURE — 93010 ELECTROCARDIOGRAM REPORT: CPT | Mod: ,,, | Performed by: STUDENT IN AN ORGANIZED HEALTH CARE EDUCATION/TRAINING PROGRAM

## 2025-06-09 PROCEDURE — 25500020 PHARM REV CODE 255: Performed by: INTERNAL MEDICINE

## 2025-06-09 PROCEDURE — 27000221 HC OXYGEN, UP TO 24 HOURS

## 2025-06-09 PROCEDURE — C9610 OPTIME CATH, TRANSLUMINAL DRUG DEL W/WO ANGIO, CORONARY, NON LASER: HCPCS | Performed by: INTERNAL MEDICINE

## 2025-06-09 PROCEDURE — 93459 L HRT ART/GRFT ANGIO: CPT | Mod: XU | Performed by: INTERNAL MEDICINE

## 2025-06-09 PROCEDURE — 25000003 PHARM REV CODE 250: Performed by: INTERNAL MEDICINE

## 2025-06-09 PROCEDURE — 99153 MOD SED SAME PHYS/QHP EA: CPT | Performed by: INTERNAL MEDICINE

## 2025-06-09 PROCEDURE — 93005 ELECTROCARDIOGRAM TRACING: CPT

## 2025-06-09 PROCEDURE — 63600175 PHARM REV CODE 636 W HCPCS: Performed by: PHYSICIAN ASSISTANT

## 2025-06-09 PROCEDURE — B2131ZZ FLUOROSCOPY OF MULTIPLE CORONARY ARTERY BYPASS GRAFTS USING LOW OSMOLAR CONTRAST: ICD-10-PCS | Performed by: INTERNAL MEDICINE

## 2025-06-09 PROCEDURE — 94761 N-INVAS EAR/PLS OXIMETRY MLT: CPT

## 2025-06-09 PROCEDURE — 80061 LIPID PANEL: CPT | Performed by: NURSE PRACTITIONER

## 2025-06-09 PROCEDURE — 83036 HEMOGLOBIN GLYCOSYLATED A1C: CPT | Performed by: NURSE PRACTITIONER

## 2025-06-09 PROCEDURE — C1725 CATH, TRANSLUMIN NON-LASER: HCPCS | Performed by: INTERNAL MEDICINE

## 2025-06-09 PROCEDURE — 25000003 PHARM REV CODE 250: Performed by: NURSE PRACTITIONER

## 2025-06-09 PROCEDURE — 83735 ASSAY OF MAGNESIUM: CPT | Performed by: NURSE PRACTITIONER

## 2025-06-09 PROCEDURE — 36415 COLL VENOUS BLD VENIPUNCTURE: CPT | Performed by: NURSE PRACTITIONER

## 2025-06-09 PROCEDURE — 80053 COMPREHEN METABOLIC PANEL: CPT | Performed by: NURSE PRACTITIONER

## 2025-06-09 PROCEDURE — 4A023N7 MEASUREMENT OF CARDIAC SAMPLING AND PRESSURE, LEFT HEART, PERCUTANEOUS APPROACH: ICD-10-PCS | Performed by: INTERNAL MEDICINE

## 2025-06-09 PROCEDURE — C1769 GUIDE WIRE: HCPCS | Performed by: INTERNAL MEDICINE

## 2025-06-09 PROCEDURE — 99900035 HC TECH TIME PER 15 MIN (STAT)

## 2025-06-09 PROCEDURE — C1894 INTRO/SHEATH, NON-LASER: HCPCS | Performed by: INTERNAL MEDICINE

## 2025-06-09 DEVICE — KIT ANGIO SEAL STS PLUS 6FR: Type: IMPLANTABLE DEVICE | Site: GROIN | Status: FUNCTIONAL

## 2025-06-09 RX ORDER — SODIUM CHLORIDE 9 MG/ML
INJECTION, SOLUTION INTRAVENOUS CONTINUOUS
Status: ACTIVE | OUTPATIENT
Start: 2025-06-09 | End: 2025-06-09

## 2025-06-09 RX ORDER — HEPARIN SODIUM 1000 [USP'U]/ML
INJECTION, SOLUTION INTRAVENOUS; SUBCUTANEOUS
Status: DISCONTINUED | OUTPATIENT
Start: 2025-06-09 | End: 2025-06-09 | Stop reason: HOSPADM

## 2025-06-09 RX ORDER — SODIUM CHLORIDE 0.9 % (FLUSH) 0.9 %
10 SYRINGE (ML) INJECTION
Status: DISCONTINUED | OUTPATIENT
Start: 2025-06-09 | End: 2025-06-10 | Stop reason: HOSPADM

## 2025-06-09 RX ORDER — MIDAZOLAM HYDROCHLORIDE 1 MG/ML
INJECTION INTRAMUSCULAR; INTRAVENOUS
Status: DISCONTINUED | OUTPATIENT
Start: 2025-06-09 | End: 2025-06-09 | Stop reason: HOSPADM

## 2025-06-09 RX ORDER — ATORVASTATIN CALCIUM 40 MG/1
40 TABLET, FILM COATED ORAL NIGHTLY
Status: DISCONTINUED | OUTPATIENT
Start: 2025-06-09 | End: 2025-06-10 | Stop reason: HOSPADM

## 2025-06-09 RX ORDER — MUPIROCIN 20 MG/G
OINTMENT TOPICAL 2 TIMES DAILY
Status: DISCONTINUED | OUTPATIENT
Start: 2025-06-09 | End: 2025-06-10 | Stop reason: HOSPADM

## 2025-06-09 RX ORDER — FENTANYL CITRATE 50 UG/ML
INJECTION, SOLUTION INTRAMUSCULAR; INTRAVENOUS
Status: DISCONTINUED | OUTPATIENT
Start: 2025-06-09 | End: 2025-06-09 | Stop reason: HOSPADM

## 2025-06-09 RX ORDER — METOPROLOL TARTRATE 25 MG/1
25 TABLET, FILM COATED ORAL 2 TIMES DAILY
Status: DISCONTINUED | OUTPATIENT
Start: 2025-06-09 | End: 2025-06-10 | Stop reason: HOSPADM

## 2025-06-09 RX ORDER — LIDOCAINE HYDROCHLORIDE 10 MG/ML
INJECTION, SOLUTION EPIDURAL; INFILTRATION; INTRACAUDAL; PERINEURAL
Status: DISCONTINUED | OUTPATIENT
Start: 2025-06-09 | End: 2025-06-09 | Stop reason: HOSPADM

## 2025-06-09 RX ORDER — ALUMINUM HYDROXIDE, MAGNESIUM HYDROXIDE, AND SIMETHICONE 1200; 120; 1200 MG/30ML; MG/30ML; MG/30ML
SUSPENSION ORAL
Status: DISCONTINUED | OUTPATIENT
Start: 2025-06-09 | End: 2025-06-09 | Stop reason: HOSPADM

## 2025-06-09 RX ORDER — IOPAMIDOL 755 MG/ML
INJECTION, SOLUTION INTRAVASCULAR
Status: DISCONTINUED | OUTPATIENT
Start: 2025-06-09 | End: 2025-06-09 | Stop reason: HOSPADM

## 2025-06-09 RX ORDER — DIPHENHYDRAMINE HYDROCHLORIDE 50 MG/ML
INJECTION, SOLUTION INTRAMUSCULAR; INTRAVENOUS
Status: DISCONTINUED | OUTPATIENT
Start: 2025-06-09 | End: 2025-06-09 | Stop reason: HOSPADM

## 2025-06-09 RX ORDER — CLOPIDOGREL BISULFATE 300 MG/1
TABLET, FILM COATED ORAL
Status: DISCONTINUED | OUTPATIENT
Start: 2025-06-09 | End: 2025-06-09 | Stop reason: HOSPADM

## 2025-06-09 RX ADMIN — CEFTRIAXONE SODIUM 1 G: 1 INJECTION, POWDER, FOR SOLUTION INTRAMUSCULAR; INTRAVENOUS at 03:06

## 2025-06-09 RX ADMIN — CLOPIDOGREL 75 MG: 75 TABLET ORAL at 09:06

## 2025-06-09 RX ADMIN — AZITHROMYCIN MONOHYDRATE 500 MG: 500 INJECTION, POWDER, LYOPHILIZED, FOR SOLUTION INTRAVENOUS at 04:06

## 2025-06-09 RX ADMIN — METOPROLOL TARTRATE 25 MG: 25 TABLET, FILM COATED ORAL at 08:06

## 2025-06-09 RX ADMIN — HEPARIN SODIUM 18 UNITS/KG/HR: 10000 INJECTION, SOLUTION INTRAVENOUS at 09:06

## 2025-06-09 RX ADMIN — METOPROLOL TARTRATE 25 MG: 25 TABLET, FILM COATED ORAL at 09:06

## 2025-06-09 RX ADMIN — ATORVASTATIN CALCIUM 40 MG: 40 TABLET, FILM COATED ORAL at 08:06

## 2025-06-09 RX ADMIN — ASPIRIN 81 MG: 81 TABLET, DELAYED RELEASE ORAL at 09:06

## 2025-06-09 NOTE — PROGRESS NOTES
Hospital Medicine  Progress Note    Patient Name: Francis Berman  MRN: [id number]  Status: IP- Inpatient   Admission Date: 6/8/2025  Length of Stay: 1  Date of Service: 06/09/2025       CC: hospital follow-up for chest pain       SUBJECTIVE     65 y.o. White male with a history that includes coronary artery disease s/p prior stents/CABG, pulmonary embolism; was transferred from outlying facility on 6/8, where he had presented with chest pain.  Troponin noted to be elevated at 0.480, pro  and D-dimer 0.70.  EKG revealed normal sinus rhythm, right bundle-branch block, left anterior fascicular block, bifascicular block, and age-indeterminate, lateral and inferior infarct.  Tele cardiology was consulted and recommended heparin drip and transfer.      Evaluation here noted patient HDS, oxygenating adequately on room air.  repeat troponin up 0.536 then 1.129.  CTA of the chest negative for pulmonary embolism but revealed perivascular adenopathy versus right middle lobe airspace consolidation as well as multiple enlarged mediastinal lymph nodes. In addition to chest pain, patient also endorsed cough productive of green sputum, but denied shortness of breath, fever, or chills.  Of note, patient is a 1.5 PPD smoker, without a documented history of COPD.  Patient was subsequently admitted to hospital medicine services for NSTEMI and CAP.  Cardiology consulted.       Today: Patient seen and examined at bedside, and chart reviewed.  Denies chest pain or SOB, only complains of cough and congestion.      MEDICATIONS   Scheduled   aspirin  81 mg Oral Daily    azithromycin  500 mg Intravenous Q24H    cefTRIAXone (Rocephin) IV (PEDS and ADULTS)  1 g Intravenous Q24H    clopidogreL  75 mg Oral Daily    heparin (PORCINE)  43.9 Units/kg Intravenous Once     Continuous Infusions   heparin (porcine) in D5W  0-40 Units/kg/hr Intravenous Continuous 16.4 mL/hr at 06/08/25 1749 18 Units/kg/hr at 06/08/25 1749       PHYSICAL EXAM   VITALS:  T 98 °F (36.7 °C)   /87   P 74   RR 16   O2 95 %    GENERAL: Awake and in NAD  LUNGS: CTA anteriorly  CVS: Normal rate  GI/: Soft, NT, bowel sounds positive.  EXTREMITIES: No LE edema  NEURO: AAOx3  PSYCH: Cooperative      LABS   CBC  Recent Labs     06/08/25  0421 06/09/25  0353   WBC 6.82 5.06   RBC 4.61 4.48*   HGB 14.5 14.0   HCT 40.2 39.8*   MCV 87.2 88.8   MCH 31.5 31.3*   MCHC 36.1 35.2   RDW 12.9 12.9    141     CHEM  Recent Labs     06/07/25  2344 06/09/25  0353    138   K 4.1 4.1   CO2 26 26   BUN 16 12.2   CREATININE 1.28* 1.16   CALCIUM 9.1 8.9   MG 2.10 2.00   ALBUMIN 3.9 3.2*   GLOBULIN 3.0 3.4   ALKPHOS 199* 147   ALT 41 32   AST 35 24   BILITOT 0.5 0.5   TSH  --  1.062         MICROBIOLOGY     Microbiology Results (last 7 days)       Procedure Component Value Units Date/Time    Blood Culture [[id number]] Collected: 06/08/25 0931    Order Status: Resulted Specimen: Blood from Arm, Left Updated: 06/08/25 1755    Blood Culture [[id number]] Collected: 06/08/25 0931    Order Status: Resulted Specimen: Blood from Antecubital, Left Updated: 06/08/25 1755            ASSESSMENT   NSTEMI, type unknown   RML, CAP  Tobacco use  Combined systolic/diastolic heart failure, compensated  h/o CAD s/p prior stents/CABG  h/o pulmonary embolism on Eliquis      PLAN   Continue ASA/Plavix, along with heparin infusion  Will add statin, BB  Continue antibiotics with ceftriaxone and azithromycin, will follow cultures  Otherwise [facility] timing per Cardiology, holding Eliquis      Prophylaxis: AC as above        Florencio Tadeo MD  Hospital Medicine    Critical Care Time: 33 minutes spent in direct hands on care, review of labs, imaging and medical record, and discussion of diagnosis, treatment, prognosis with patient/family.  Patient remains at high-risk for clinical decompensation  Critical Care diagnosis: NSTEMI on heparin infusion

## 2025-06-09 NOTE — PROGRESS NOTES
Inpatient Nutrition Assessment    Admit Date: 6/8/2025   Total duration of encounter: 1 day   Patient Age: 65 y.o.    Nutrition Recommendation/Prescription     Diet NPO ordered, advance as medically feasible  Monitor appetite/PO intake, weight, and labs    Communication of Recommendations: reviewed with patient    Nutrition Assessment     Malnutrition Assessment/Nutrition-Focused Physical Exam       Malnutrition Level: other (see comments) (Does not meet criteria) (06/09/25 1444)  Energy Intake (Malnutrition): other (see comments) (Does not meet criteria) (06/09/25 1444)  Weight Loss (Malnutrition): other (see comments) (Does not meet criteria) (06/09/25 1444)                                                  A minimum of two characteristics is recommended for diagnosis of either severe or non-severe malnutrition.    Chart Review    Reason Seen: continuous nutrition monitoring CHF    Malnutrition Screening Tool Results              Diagnosis:  NSTEMI   CAD/CABG/PCI - Unknown Anatomy  Hx of MI  Tobacco Abuse  ETOH Abuse  Medical Therapy Non-Compliance  HLD  Dysarthria  On Eliquis as OP - Unknown Reason (No Clear Indication)  No Hx of GIB     Relevant Medical History:    Dysarthria 06/07/2024    History of heart attack      History of open heart surgery      Hyperlipidemia        Scheduled Medications:  aspirin, 81 mg, Daily  atorvastatin, 40 mg, QHS  azithromycin, 500 mg, Q24H  cefTRIAXone (Rocephin) IV (PEDS and ADULTS), 1 g, Q24H  clopidogreL, 75 mg, Daily  heparin (PORCINE), 43.9 Units/kg, Once  metoprolol tartrate, 25 mg, BID  mupirocin, , BID    Continuous Infusions:  0.9% NaCl  heparin (porcine) in D5W, Last Rate: 18 Units/kg/hr (06/09/25 0955)    PRN Medications:  acetaminophen, 650 mg, Q4H PRN  aluminum-magnesium hydroxide-simethicone, , PRN  clopidogreL, , PRN  diphenhydrAMINE, , PRN  fentaNYL, , PRN  heparin (porcine), , PRN  heparin (PORCINE), 43.9 Units/kg, PRN  heparin (PORCINE), 30 Units/kg,  "PRN  hydrALAZINE, 20 mg, Q4H PRN  iopamidoL, , PRN  LIDOcaine (PF) 10 mg/ml (1%), , PRN  midazolam, , PRN  ondansetron, 4 mg, Q6H PRN  sodium chloride 0.9%, 10 mL, PRN    Calorie Containing IV Medications: no significant kcals from medications at this time    Recent Labs   Lab 25  2344 25  0421 25  0353     --  138   K 4.1  --  4.1   CALCIUM 9.1  --  8.9   MG 2.10  --  2.00     --  102   CO2 26  --  26   BUN 16  --  12.2   CREATININE 1.28*  --  1.16   EGFRNORACEVR 61  --  >60   *  --  98   BILITOT 0.5  --  0.5   ALKPHOS 199*  --  147   ALT 41  --  32   AST 35  --  24   ALBUMIN 3.9  --  3.2*   TRIG  --   --  164*   HGBA1C  --   --  5.3   WBC 5.98 6.82 5.06   HGB 14.3 14.5 14.0   HCT 39.4 40.2 39.8*     Nutrition Orders:  Diet NPO      Appetite/Oral Intake: NPO/NPO  Factors Affecting Nutritional Intake: NPO  Social Needs Impacting Access to Food: none identified  Food/Latter-day/Cultural Preferences: none reported  Food Allergies: none reported  Last Bowel Movement: 25  Wound(s):  none noted    Comments    2025: Pt reports a good appetite/PO intake prior to admit. Pt NPO. Pt denies N/V/D/C and chewing/swallowing difficulties. Pt denies unplanned wt loss. Per EMR, pt weighed 83.7 kg on 2024. Last BM noted. Will monitor.    Anthropometrics    Height: 5' 8" (172.7 cm),    Last Weight: 86.2 kg (190 lb 0.6 oz) (25 0500), Weight Method: Standard Scale  BMI (Calculated): 28.9  BMI Classification: overweight (BMI 25-29.9)        Ideal Body Weight (IBW), Male: 154 lb     % Ideal Body Weight, Male (lb): 130.56 %                 Usual Body Weight (UBW), k.7 kg  % Usual Body Weight: 103.2     Usual Weight Provided By: EMR weight history    Wt Readings from Last 5 Encounters:   25 86.2 kg (190 lb 0.6 oz)   25 91.2 kg (201 lb)   24 84.4 kg (186 lb)   24 83.9 kg (185 lb)   24 83.7 kg (184 lb 8.4 oz)     Weight Change(s) Since Admission: "   6/9/2025: 86.2 kg  Wt Readings from Last 1 Encounters:   06/09/25 0500 86.2 kg (190 lb 0.6 oz)   06/08/25 0738 91.2 kg (201 lb 1 oz)   Admit Weight: 91.2 kg (201 lb 1 oz) (06/08/25 0738), Weight Method: Standard Scale    Estimated Needs    Weight Used For Calorie Calculations: 86.2 kg (190 lb 0.6 oz)  Energy Calorie Requirements (kcal): 2155 (25 kcal/kg)  Energy Need Method: Kcal/kg  Weight Used For Protein Calculations: 86.2 kg (190 lb 0.6 oz)  Protein Requirements:  (1.0-1.2 g/kg)  Fluid Requirements (mL): 2000 (CHF)        Enteral Nutrition     Patient not receiving enteral nutrition at this time.    Parenteral Nutrition     Patient not receiving parenteral nutrition support at this time.    Evaluation of Received Nutrient Intake    Calories: not meeting estimated needs  Protein: not meeting estimated needs    Patient Education     Not applicable.    Nutrition Diagnosis     PES: No nutrition diagnosis at this time     PES:            Nutrition Interventions     Intervention(s): general/healthful diet  Intervention(s):      Goal: Meet greater than 80% of nutritional needs by follow-up. (new)    Nutrition Goals & Monitoring     Dietitian will monitor: food and beverage intake, weight, electrolyte/renal panel, glucose/endocrine profile, and gastrointestinal profile  Discharge planning: continue Heart Healthy diet  Nutrition Risk/Follow-Up: dietitian will follow-up one time per week   Please consult if re-assessment needed sooner.

## 2025-06-09 NOTE — PLAN OF CARE
06/09/25 1534   Discharge Assessment   Assessment Type Discharge Planning Assessment   Confirmed/corrected address, phone number and insurance Yes   Confirmed Demographics Correct on Facesheet  (pt also has a po box: [address], [place] [address])   Source of Information patient   Communicated BARBY with patient/caregiver Date not available/Unable to determine   People in Home alone  (pt lives alone in a mobile home with 4 steps to enter the home and rails along the steps)   Facility Arrived From: [facility]   Do you expect to return to your current living situation? Yes   Do you have help at home or someone to help you manage your care at home? Yes   Who are your caregiver(s) and their phone number(s)? pt reports he can get family to assist him if needed   Prior to hospitilization cognitive status: Unable to Assess   Current cognitive status: Alert/Oriented   Walking or Climbing Stairs Difficulty no   Dressing/Bathing Difficulty no   Home Layout Able to live on 1st floor   Equipment Currently Used at Home none   Readmission within 30 days? No   Patient currently being followed by outpatient case management? No   Do you currently have service(s) that help you manage your care at home? No   Do you take prescription medications? Yes   Do you have prescription coverage? Yes   Who is going to help you get home at discharge? family   How do you get to doctors appointments? car, drives self   Are you on dialysis? No   Discharge Plan A Home   Discharge Plan B Home   DME Needed Upon Discharge  none   Discharge Plan discussed with: Patient   Transition of Care Barriers None   Housing Stability   In the last 12 months, was there a time when you were not able to pay the mortgage or rent on time? N   Transportation Needs   In the past 12 months, has lack of transportation kept you from medical appointments or from getting medications? no   Food Insecurity   Within the past 12 months, you worried that your food would run  out before you got the money to buy more. Never true   Utilities   In the past 12 months has the electric, gas, oil, or water company threatened to shut off services in your home? No     Pt's PCP is Luzmaria VANG. Pt's  is pt's son, Rajendra ([phone]). Pt has never had  services. Pt uses [facility] in [place]. Pt does drive. CM to follow

## 2025-06-09 NOTE — PROGRESS NOTES
[facility]    Cardiology  Progress Note    Patient Name: Francis Berman  MRN: [id number]  Admission Date: 6/8/2025  Hospital Length of Stay: 1 days  Code Status: Prior   Attending Physician: Jaylon Melgar MD   Primary Care Physician: Luzmaria Cooper FNP  Expected Discharge Date:   Principal Problem:<principal problem not specified>    Subjective:   Chief Complaint/Reason for Consult: NSTEMI      HPI: Mr. Berman is a 66 y/o male who is unknown to [facility]. He has a history of coronary artery disease status post coronary artery bypass grafting hypercoagulable blood disorder currently on chronic anticoagulation, pulmonary embolus, hypertension and alcohol abuse. Since the ER this evening with complaints of chest discomfort that started about 2 hours prior to his arrival.  It was substernal pressure without radiation or shortness of breath.  EKG done upon arrival shows a normal sinus rhythm with a right bundle branch block.  There are no previous EKGs to compare.  Chest x-ray shows some mild congestion.  Lab work showed a troponin of 0.480 and BNP was mildly elevated at 485. He underwent a Telecardiology Consult and it was recommended for transfer to a PCI capable facility. He was placed on Heparin and his Troponin did uptrend to > 1. [facility] was consutled for NSTEMI.      PMH: Dysarthria, MI, HLD, CAD  PSH: Sternotomy (Sternal Wires on XR)/Unknown Type, Angiogram  Family History: Mother, L, PAD, DM II; Father, DM II  Social History: 1.5 ppd Tobacco Use for 40+ Years; + ETOH Use; Denies Illicit Drug Use      Previous Cardiac Diagnostics:    Left Ventricle: The left ventricle is normal in size. Normal wall thickness. Normal wall motion. There is mildly reduced systolic function with a visually estimated ejection fraction of 40 - 45%. Grade I diastolic dysfunction.    Right Ventricle: Systolic function is normal. TAPSE is 1.9 cm.    Mitral Valve: There is trace regurgitation.    Tricuspid Valve:  There is trace regurgitation.    Pulmonary Artery: The estimated pulmonary artery systolic pressure is 22 mmHg.    IVC/SVC: Normal venous pressure at 3 mmHg.    Pericardium: There is no pericardial effusion.       Subjectively Reported:  CABG in the 90's  PCI stenting pre and post bypass  He is unsure of when      Review of Systems   Cardiovascular:  Negative for chest pain and dyspnea on exertion.   Respiratory:  Negative for shortness of breath.      Objective:     Vital Signs (Most Recent):  Temp: 98 °F (36.7 °C) (06/09/25 0811)  Pulse: 74 (06/09/25 0954)  Resp: 16 (06/09/25 0811)  BP: 133/87 (06/09/25 0954)  SpO2: 95 % (06/09/25 0811) Vital Signs (24h Range):  Temp:  [97.8 °F (36.6 °C)-98.1 °F (36.7 °C)] 98 °F (36.7 °C)  Pulse:  [63-77] 74  Resp:  [11-17] 16  SpO2:  [95 %-97 %] 95 %  BP: (102-140)/(54-87) 133/87   Weight: 86.2 kg (190 lb 0.6 oz)  Body mass index is 28.89 kg/m².  SpO2: 95 %       Intake/Output Summary (Last 24 hours) at 6/9/2025 1010  Last data filed at 6/8/2025 1806  Gross per 24 hour   Intake 133.6 ml   Output --   Net 133.6 ml     Lines/Drains/Airways       Peripheral Intravenous Line  Duration                  Peripheral IV - Single Lumen 06/08/25 0819 20 G Anterior;Left;Proximal Forearm 1 day                    Significant Labs:   Chemistries:   Recent Labs   Lab 06/07/25  2344 06/08/25  0147 06/08/25  0819 06/09/25  0353     --   --  138   K 4.1  --   --  4.1     --   --  102   CO2 26  --   --  26   BUN 16  --   --  12.2   CREATININE 1.28*  --   --  1.16   CALCIUM 9.1  --   --  8.9   PROT 6.9  --   --  6.6   BILITOT 0.5  --   --  0.5   ALKPHOS 199*  --   --  147   ALT 41  --   --  32   AST 35  --   --  24   MG 2.10  --   --  2.00   TROPONINI 0.480* 0.536* 1.129*  --         CBC/Anemia Labs: [facility]:    Recent Labs   Lab 06/07/25  2344 06/08/25  0421 06/09/25  0353   WBC 5.98 6.82 5.06   HGB 14.3 14.5 14.0   HCT 39.4 40.2 39.8*    144 141   MCV 87.2 87.2 88.8   RDW 12.9 12.9  12.9    Recent Labs   Lab 06/08/25  0147 06/08/25  0905 06/09/25  0028 06/09/25  0353 06/09/25  0706   INR 0.9  --   --   --   --    APTT 25.4   < > 78.2* 75.0* 74.1*    < > = values in this interval not displayed.        Telemetry: SR    Physical Exam  HENT:      Head: Normocephalic.      Mouth/Throat:      Mouth: Mucous membranes are dry.   Cardiovascular:      Rate and Rhythm: Normal rate.   Pulmonary:      Effort: Pulmonary effort is normal.      Breath sounds: Normal breath sounds.   Abdominal:      General: Bowel sounds are normal.   Musculoskeletal:      Right lower leg: No edema.      Left lower leg: No edema.   Skin:     General: Skin is warm and dry.      Capillary Refill: Capillary refill takes less than 2 seconds.   Neurological:      Mental Status: He is alert.         Current Schedule Inpatient Medications:   aspirin  81 mg Oral Daily    atorvastatin  40 mg Oral QHS    azithromycin  500 mg Intravenous Q24H    cefTRIAXone (Rocephin) IV (PEDS and ADULTS)  1 g Intravenous Q24H    clopidogreL  75 mg Oral Daily    heparin (PORCINE)  43.9 Units/kg Intravenous Once    metoprolol tartrate  25 mg Oral BID    mupirocin   Nasal BID     Continuous Infusions:   heparin (porcine) in D5W  0-40 Units/kg/hr Intravenous Continuous 16.4 mL/hr at 06/09/25 0955 18 Units/kg/hr at 06/09/25 0955       Assessment:   NSTEMI ,unspecified   --Troponin 0.480, 0536, 1.129  CAD  --CABG/PCI - Unknown Anatomy  Hypercoagulable blood disorder  -- On Eliquis outpatient   Hx of MI  Tobacco Abuse  ETOH Abuse  Medical Therapy Non-Compliance  HLD  Dysarthria  No Hx of GIB     Plan:   Continue Heparin Drip per Protocol  Last Dose of Eliquis 6.7.25 at 2100 PM   Continue ASA 81mg PO Qday  Continue  Atorvastatin 40mg PO Qday  Plan for LHC today      Scribed in the presence of Dr. Nam Shirley, RN  Cardiology  [facility]     Physician addendum:        Patient's cardiac care is performed as a split-shared  visit with [facility] d/t complicated medical management as detailed in A/P and associated high acuity requiring physician expertise. I obtained and performed relevant components of history/exam. Medical decision-making is formulated by me. It is a pleasure to care for the patient.    Vidal Browning MD  Cardiology

## 2025-06-10 VITALS
SYSTOLIC BLOOD PRESSURE: 100 MMHG | TEMPERATURE: 98 F | DIASTOLIC BLOOD PRESSURE: 67 MMHG | BODY MASS INDEX: 28.8 KG/M2 | HEIGHT: 68 IN | OXYGEN SATURATION: 95 % | RESPIRATION RATE: 16 BRPM | WEIGHT: 190.06 LBS | HEART RATE: 60 BPM

## 2025-06-10 PROBLEM — I21.4 NSTEMI (NON-ST ELEVATED MYOCARDIAL INFARCTION): Status: ACTIVE | Noted: 2025-06-10

## 2025-06-10 PROBLEM — I21.4 NSTEMI (NON-ST ELEVATED MYOCARDIAL INFARCTION): Status: RESOLVED | Noted: 2025-06-10 | Resolved: 2025-06-10

## 2025-06-10 PROCEDURE — 25000003 PHARM REV CODE 250: Performed by: STUDENT IN AN ORGANIZED HEALTH CARE EDUCATION/TRAINING PROGRAM

## 2025-06-10 PROCEDURE — 63600175 PHARM REV CODE 636 W HCPCS: Performed by: PHYSICIAN ASSISTANT

## 2025-06-10 PROCEDURE — 25000003 PHARM REV CODE 250: Performed by: INTERNAL MEDICINE

## 2025-06-10 PROCEDURE — 25000003 PHARM REV CODE 250: Performed by: NURSE PRACTITIONER

## 2025-06-10 RX ORDER — PANTOPRAZOLE SODIUM 40 MG/1
40 TABLET, DELAYED RELEASE ORAL DAILY
Status: DISCONTINUED | OUTPATIENT
Start: 2025-06-10 | End: 2025-06-10 | Stop reason: HOSPADM

## 2025-06-10 RX ORDER — NITROGLYCERIN 0.4 MG/1
0.4 TABLET SUBLINGUAL EVERY 5 MIN PRN
Status: DISCONTINUED | OUTPATIENT
Start: 2025-06-10 | End: 2025-06-10 | Stop reason: HOSPADM

## 2025-06-10 RX ORDER — METOPROLOL SUCCINATE 25 MG/1
12.5 TABLET, EXTENDED RELEASE ORAL DAILY
Qty: 45 TABLET | Refills: 0 | Status: SHIPPED | OUTPATIENT
Start: 2025-06-10 | End: 2026-06-10

## 2025-06-10 RX ORDER — CEFDINIR 300 MG/1
300 CAPSULE ORAL 2 TIMES DAILY
Qty: 12 CAPSULE | Refills: 0 | Status: SHIPPED | OUTPATIENT
Start: 2025-06-10 | End: 2025-06-18

## 2025-06-10 RX ORDER — CLOPIDOGREL BISULFATE 75 MG/1
75 TABLET ORAL DAILY
Qty: 90 TABLET | Refills: 0 | Status: SHIPPED | OUTPATIENT
Start: 2025-06-11

## 2025-06-10 RX ADMIN — PANTOPRAZOLE SODIUM 40 MG: 40 TABLET, DELAYED RELEASE ORAL at 01:06

## 2025-06-10 RX ADMIN — CEFTRIAXONE SODIUM 1 G: 1 INJECTION, POWDER, FOR SOLUTION INTRAMUSCULAR; INTRAVENOUS at 03:06

## 2025-06-10 RX ADMIN — CLOPIDOGREL 75 MG: 75 TABLET ORAL at 08:06

## 2025-06-10 RX ADMIN — MUPIROCIN: 20 OINTMENT TOPICAL at 08:06

## 2025-06-10 RX ADMIN — ASPIRIN 81 MG: 81 TABLET, DELAYED RELEASE ORAL at 08:06

## 2025-06-10 NOTE — DISCHARGE INSTRUCTIONS
ANGIOGRAM/ANGIOPLASTY/STENT PLACEMENT    · WHAT IS AN ANGIOGRAM?  o A catheter was placed through the blood vessel in your groin/wrist, contrast was injected into the vessels were taken.    · WHAT IS AN ANGIOPLASTY?  o A catheter was placed through the blood vessel in your groin/wrist and directed to an area of blocked blood flow. A balloon, and possibly a metal stent, were used to open the blockage. If no other blockages are present below this area your symptoms should improve. If blockages are present below this area, surgery may be necessary.    · WHAT IS STENT PLACEMENT?  o A catheter was placed in your groin/wrist through which a metal mesh tube was placed in a narrowed part of the artery to facilitate blood flow.  o You may feel some discomfort at the insertions site that should improve over a few days.    · POST-OP CARE:  o May remove dressing 24 hours after procedure. (Soak dressing with warm water and gently peel off. DO NOT RIP).  o You may shower with antibacterial soap and water only. NO tub bathing/swimming for 3 days!  o Keep the site clean and dry. No ointments, powder, cologne, or lotion near the site until fully healed. (About 2 weeks)  o Monitor site for signs or symptoms of infection  § Fever >101  § Yellow/green drainage  § Swelling  § Worsening pain  § Dizziness/fainting    o NO LIFTING PUSHING OR PULLING ANYTHING GREATER THAN 5 POUNDS (A GALLON OF MILK) FOR 1 WEEK.  o NO DRIVING FOR 48 HOURS. (This includes all motor vehicles)    o If you begin to bleed, lay flat immediately, apply firm direct pressure to the site. CALL 911. (THIS IS AN EMERGENCY!!)        Our goal at [facility] is to always give you outstanding care and exceptional service. You may receive a survey from [employer] by mail, text or e-mail in the next 24-48 hours asking about the care you received with us. The survey should only take 5-10 minutes to complete and is very important to us.     Your feedback provides us with a way to  recognize our staff who work tirelessly to provide the best care! Also, your responses help us learn how to improve when your experience was below our aspiration of excellence. We are always looking for ways to improve your stay. We WILL use your feedback to continue making improvements to help us provide the highest quality care. We keep your personal information and feedback confidential. We appreciate your time completing this survey and can't wait to hear from you!!!    We look forward to your continued care with us! Thanks so much for choosing [facility] for your healthcare needs!

## 2025-06-10 NOTE — PROGRESS NOTES
[facility]    Cardiology  Progress Note    Patient Name: Francis Berman  MRN: [id number]  Admission Date: 6/8/2025  Hospital Length of Stay: 2 days  Code Status: Prior   Attending Physician: Jaylon Melgar MD   Primary Care Physician: Luzmaria Cooper FNP  Expected Discharge Date: 6/10/2025  Principal Problem:<principal problem not specified>    Subjective:   Chief Complaint/Reason for Consult: NSTEMI      HPI: Mr. Berman is a 66 y/o male who is unknown to [facility]. He has a history of coronary artery disease status post coronary artery bypass grafting hypercoagulable blood disorder currently on chronic anticoagulation, pulmonary embolus, hypertension and alcohol abuse. Since the ER this evening with complaints of chest discomfort that started about 2 hours prior to his arrival.  It was substernal pressure without radiation or shortness of breath.  EKG done upon arrival shows a normal sinus rhythm with a right bundle branch block.  There are no previous EKGs to compare.  Chest x-ray shows some mild congestion.  Lab work showed a troponin of 0.480 and BNP was mildly elevated at 485. He underwent a Telecardiology Consult and it was recommended for transfer to a PCI capable facility. He was placed on Heparin and his Troponin did uptrend to > 1. [facility] was consutled for NSTEMI.      Hospital Course :  6.10.25 NAD noted, right groin soft to touch, no hematoma or bruising noted. Right pedal pulse 2+. No Chest pain/ palps/ SOB.    PMH: Dysarthria, MI, HLD, CAD  PSH: Sternotomy (Sternal Wires on XR)/Unknown Type, Angiogram  Family History: Mother, L, PAD, DM II; Father, DM II  Social History: 1.5 ppd Tobacco Use for 40+ Years; + ETOH Use; Denies Illicit Drug Use      Previous Cardiac Diagnostics:     [facility] 6.9.2025    CAD    The 1st Diag lesion was 99% stenosed with 20% stenosis post-intervention.  Successful POBA with a DCB 2/15 to the ostial diagonal    The ejection fraction was calculated to be  40%.    LIMA to LAD is atretic    SVG - not visualized    TTE 6.8.2025   Left Ventricle: The left ventricle is normal in size. Normal wall thickness. Normal wall motion. There is mildly reduced systolic function with a visually estimated ejection fraction of 40 - 45%. Grade I diastolic dysfunction.    Right Ventricle: Systolic function is normal. TAPSE is 1.9 cm.    Mitral Valve: There is trace regurgitation.    Tricuspid Valve: There is trace regurgitation.    Pulmonary Artery: The estimated pulmonary artery systolic pressure is 22 mmHg.    IVC/SVC: Normal venous pressure at 3 mmHg.    Pericardium: There is no pericardial effusion.    Subjectively Reported:  CABG in the 90's  PCI stenting pre and post bypass  He is unsure of when      Review of Systems   Cardiovascular:  Negative for chest pain and dyspnea on exertion.   Respiratory:  Negative for shortness of breath.      Objective:     Vital Signs (Most Recent):  Temp: 97.8 °F (36.6 °C) (06/10/25 0802)  Pulse: (!) 58 (06/10/25 0802)  Resp: 14 (06/10/25 0802)  BP: 117/76 (06/10/25 0802)  SpO2: (!) 94 % (06/10/25 0802) Vital Signs (24h Range):  Temp:  [97.6 °F (36.4 °C)-98.3 °F (36.8 °C)] 97.8 °F (36.6 °C)  Pulse:  [52-63] 58  Resp:  [14] 14  SpO2:  [93 %-95 %] 94 %  BP: (101-123)/(67-76) 117/76   Weight: 86.2 kg (190 lb 0.6 oz)  Body mass index is 28.89 kg/m².  SpO2: (!) 94 %       Intake/Output Summary (Last 24 hours) at 6/10/2025 1136  Last data filed at 6/10/2025 0454  Gross per 24 hour   Intake 120 ml   Output --   Net 120 ml     Lines/Drains/Airways       Peripheral Intravenous Line  Duration                  Peripheral IV - Single Lumen 06/08/25 0819 20 G Anterior;Left;Proximal Forearm 2 days                    Significant Labs:   Chemistries:   Recent Labs   Lab 06/07/25  2344 06/08/25  0147 06/08/25  0819 06/09/25  0353     --   --  138   K 4.1  --   --  4.1     --   --  102   CO2 26  --   --  26   BUN 16  --   --  12.2   CREATININE 1.28*  --    --  1.16   CALCIUM 9.1  --   --  8.9   PROT 6.9  --   --  6.6   BILITOT 0.5  --   --  0.5   ALKPHOS 199*  --   --  147   ALT 41  --   --  32   AST 35  --   --  24   MG 2.10  --   --  2.00   TROPONINI 0.480* 0.536* 1.129*  --         CBC/Anemia Labs: [facility]:    Recent Labs   Lab 06/07/25  2344 06/08/25  0421 06/09/25  0353   WBC 5.98 6.82 5.06   HGB 14.3 14.5 14.0   HCT 39.4 40.2 39.8*    144 141   MCV 87.2 87.2 88.8   RDW 12.9 12.9 12.9    Recent Labs   Lab 06/08/25  0147 06/08/25  0905 06/09/25  0028 06/09/25  0353 06/09/25  0706   INR 0.9  --   --   --   --    APTT 25.4   < > 78.2* 75.0* 74.1*    < > = values in this interval not displayed.        Telemetry: SR    Physical Exam  HENT:      Head: Normocephalic.      Mouth/Throat:      Mouth: Mucous membranes are dry.   Cardiovascular:      Rate and Rhythm: Normal rate.   Pulmonary:      Effort: Pulmonary effort is normal.      Breath sounds: Normal breath sounds.   Abdominal:      General: Bowel sounds are normal.   Musculoskeletal:      Right lower leg: No edema.      Left lower leg: No edema.      Comments: Right groin soft . No hematoma noted or bruising. Right pedal pulse 2+   Skin:     General: Skin is warm and dry.      Capillary Refill: Capillary refill takes less than 2 seconds.   Neurological:      Mental Status: He is alert.         Current Schedule Inpatient Medications:   aspirin  81 mg Oral Daily    atorvastatin  40 mg Oral QHS    azithromycin  500 mg Intravenous Q24H    cefTRIAXone (Rocephin) IV (PEDS and ADULTS)  1 g Intravenous Q24H    clopidogreL  75 mg Oral Daily    metoprolol tartrate  25 mg Oral BID    mupirocin   Nasal BID             Assessment:   NSTEMI , Type I, (Non Anterior Wall )  --Troponin 0.480, 0536, 1.129  CAD  --CABG/PCI - LIMA - LAD, SVG - 1st MRG  --LHC (6.9.2025) PTCA 1st diag (99%)  --LVEF 40%  Hypercoagulable blood disorder  -- On Eliquis outpatient   Hx of MI  Tobacco Abuse  ETOH Abuse  Medical Therapy  Non-Compliance  HLD  Dysarthria  No Hx of GIB     Plan:   Stop Heparin Drip   Continue Plavix 75 mg daily  Continue Eliquis 5 mg daily   Stop  ASA 81mg PO Qday  (to avoid triple therapy)  Continue  Atorvastatin 40mg PO Qday  Transition to metoprolol succinate 25mg daily upon discharge  Start Nitroglycerin 0.4 mg SL Q 5min  for acute angina, max 3 doses  Start Protonix 40 mg Po Q day ( Gastric  Protection with DAPT)  Okay to discharge home, discharge Diet: Cardiac, Low Sodium.  Discharge Activity:  As Tolerated, No Heavy Lifting > 5 lbs., Notify MD with Symptoms of Bleed/Infection      Scribed in the presence of Dr. Enid Shirley, RN  Cardiology  [facility]     I have seen and examined this patient as a split-shared visit with the COBY d/t complicated medical management of above problems noted in assessment and high acuity requiring physician expertise in medical decision-making. I performed the substantive portion of the history and exam. Medical decision-making is also formulated by me.     S/p POBA to D1. Noted to have atretic LIMA-LAD and SVG not visualized. Ongoing medical management of CAD. Outpatient cardiology follow up.    Monica Escamilla MD  Cardiologist

## 2025-06-10 NOTE — NURSING
AVS virtually reviewed with patient and daughter in its entirety with emphasis on diet, medications, follow-up appointments and reasons to return to the ED. Patient also encouraged to utilize their patient portal. Ease and convenience of use reiterated. Education complete and patient and daughter voiced understanding. All questions answered. Discharge teaching complete.

## 2025-06-13 LAB
BACTERIA BLD CULT: NORMAL
BACTERIA BLD CULT: NORMAL

## 2025-06-14 NOTE — DISCHARGE SUMMARY
Hospital Medicine  Discharge Summary    Patient Name: Francis Berman  MRN: [id number]  Admit Date: 6/8/2025  Discharge Date: 6/10/2025   Status: IP- Inpatient   Length of Stay: 2      PHYSICIANS   Admitting Physician: Nayan Lei MD  Discharging Physician: Florencio Tadeo MD.  Primary Care Physician: Luzmaria Cooper FNP  Consults: Cardiology      DISCHARGE DIAGNOSES   NSTEMI, type I   RML, CAP  Tobacco use  Combined systolic/diastolic heart failure, compensated  h/o CAD s/p prior stents/CABG  h/o pulmonary embolism on Eliquis      PROCEDURES   Left heart catheterization      HOSPITAL COURSE      65 y.o. White male with a history that includes coronary artery disease s/p prior stents/CABG, pulmonary embolism; [facility] where he had presented with chest pain.  Troponin noted to be elevated at 0.480, pro  and D-dimer 0.70.  EKG revealed normal sinus rhythm, right bundle-branch block, left anterior fascicular block, bifascicular block, and age-indeterminate, lateral and inferior infarct.  Tele cardiology was consulted and recommended heparin drip and transfer.      Evaluation here noted patient HDS, oxygenating adequately on room air.  repeat troponin up 0.536 then 1.129.  CTA of the chest negative for pulmonary embolism but revealed perivascular adenopathy versus right middle lobe airspace consolidation as well as multiple enlarged mediastinal lymph nodes. In addition to chest pain, patient also endorsed cough productive of green sputum, but denied shortness of breath, fever, or chills.  Of note, patient is a 1.5 PPD smoker, without a documented history of COPD.  Patient was subsequently admitted to hospital medicine services for NSTEMI and CAP.  Cardiology consulted.  Echo noting LVEF of 40-45%, grade I DD.  Patient underwent LHC 6/9 noting 99% stenosis 1st diagonal, underwent PTCA.  Continued on Plavix and Eliquis (ASA stopped to avoid triple therapy).  Patient was cleared  by cardiology.  Patient continued on antibiotics for CAP, but was stable on room air.  He can be transitioned to oral antibiotics and discharged home.    STATUS  Improved    DISPOSITION  Discharge to home    DIET  Cardiac    ACTIVITY  As tolerated      FOLLOW-UP       Luzmaria Cooper FNP. Go on 6/23/2025.    Specialty: Family Medicine  Why: Monday, June 23rd 3:30pm! :)  Contact information:  [address] [place] [id number]  [phone]               May, Kobe ADVID MD. Go on 6/16/2025.    Specialty: Cardiology  Why: Monday, June 16th @2pm! :)  Contact information:  [address] [place] [address]  [phone]                 DISCHARGE MEDICATION RECONCILIATION     PRESCRIBED     cefdinir 300 MG capsule  Commonly known as: OMNICEF  Take 1 capsule (300 mg total) by mouth 2 (two) times daily. for 6 days     clopidogreL 75 mg tablet  Commonly known as: PLAVIX  Take 1 tablet (75 mg total) by mouth once daily.     metoprolol succinate 25 MG 24 hr tablet  Commonly known as: TOPROL-XL  Take 0.5 tablets (12.5 mg total) by mouth once daily.            CONTINUE     apixaban 5 mg Tab  Commonly known as: ELIQUIS  Take 1 tablet (5 mg total) by mouth 2 (two) times daily.     atorvastatin 40 MG tablet  Commonly known as: LIPITOR  Take 1 tablet (40 mg total) by mouth once daily.     pantoprazole 40 MG tablet  Commonly known as: PROTONIX  Take 1 tablet (40 mg total) by mouth once daily.            These medications were sent to   [facility] [id number] [place] [address]      Phone: [phone]   cefdinir 300 MG capsule  clopidogreL 75 mg tablet  metoprolol succinate 25 MG 24 hr tablet         PHYSICAL EXAM   VITALS: T 98 °F (36.7 °C)   /67   P 60   RR 16   O2 95 %    GENERAL: Awake and in NAD  LUNGS: CTA anteriorly  CVS: Normal rate  GI/: Soft, NT, bowel sounds positive.  EXTREMITIES: No LE edema  NEURO: AAOx3  PSYCH: Cooperative      Discharge time: 33 minutes     Florencio Tadeo MD  [facility]  [facility]   Cardiac catheterization  Result Date: 6/9/2025    CAD     The 1st Diag lesion was 99% stenosed with 20% stenosis post-intervention.  Successful POBA with a DCB 2/15 to the ostial diagonal     The ejection fraction was calculated to be 40%.     LIMA to LAD is atretic     SVG - not visualized   The procedure log was documented by No documenter listed and verified by Kala Mcdermott MD. Date: 6/9/2025  Time: 1:49 PM AFTER OBTAINING INFORMED CONSENT PATIENT WAS BROUGHT TO THE CATH LAB IN A FASTING STATE.  Right groin area was prepped and draped.  Usual sterile manner using ChloraPrep solution.  Skin was infiltrated with 2% lidocaine for local anesthesia.  Five Bulgarian sheath was placed in the right femoral artery.  Five Bulgarian JL4 catheter used for selective left coronary artery angiogram.  Five Bulgarian JR4 catheter used for selective right coronary artery angiogram.  Five Bulgarian pigtail catheter used for left ventriculogram and hemodynamic assessment.  Five Bulgarian JR4 catheter used for selective LIMA graft injection and venous graft injection.  Found to have severe disease on the ostial diagonal 1.  Six Bulgarian EBU 3.5 guide catheter used for intervention.  0.014 run-through wire was placed in the diagonal crossing the lesion.  Lesion was pre-dilated by using 2/15 mm regular balloon and drug coated balloon 2/15 mm ostial diagonal at 8 atmospheres for 60 seconds.  Post angiogram shows lesion reduced from 99% to 50%.  Patient tolerated the procedure very well no complications noted     Echo  Result Date: 6/8/2025    Left Ventricle: The left ventricle is normal in size. Normal wall thickness. Normal wall motion. There is mildly reduced systolic function with a visually estimated ejection fraction of 40 - 45%. Grade I diastolic dysfunction.     Right Ventricle: Systolic function is normal. TAPSE is 1.9 cm.     Mitral Valve: There is trace regurgitation.     Tricuspid Valve: There is trace  regurgitation.     Pulmonary Artery: The estimated pulmonary artery systolic pressure is 22 mmHg.     IVC/SVC: Normal venous pressure at 3 mmHg.     Pericardium: There is no pericardial effusion.     CTA Chest Non-Coronary (PE Studies)  Result Date: 6/8/2025  EXAMINATION CTA CHEST NON CORONARY (PE STUDIES) CLINICAL HISTORY Pulmonary embolism (PE) suspected, positive D-dimer;  dyspnea TECHNIQUE Post-contrast helical-acquisition CT images were obtained, with bolus timing to pulmonary arterial system for purposes of PE protocol CTA.  Multiplanar reconstructions, including MIP images, were accomplished by a CT technologist at a separate workstation, pushed to PACS for physician review. CONTRAST IV: Omnipaque 350, 89 mL COMPARISON None available at the time of initial interpretation. FINDINGS Images were reviewed in soft tissue, lung, and bone windows. Exam quality: adequate for evaluation Lines/tubes: none visualized Pulmonary Vessels: No central or large segmental filling defect.  Soft tissue density is appreciated at the right middle lobe pulmonary arterial vasculature, may represent airspace consolidation or perivascular adenopathy. Cardiovascular: No suggestion of right heart strain; the RV:LV ratio is <1. No significant heart chamber enlargement. There is no pericardial effusion. No focal contour irregularity or intraluminal abnormality is appreciated involving the visualized aorta and branch vessels. Mediastinal postoperative changes suggestive of CABG incidentally noted. Lungs/Pleura: Central airways are patent. No acute airspace consolidation or suspicious focal lesion. No pleural thickening, significant effusion, or evidence of pneumothorax. Other Findings: Right perivascular lymph node is present adjacent to the ascending aorta, measuring 1.7 cm short axis.  There is also an enlarged right lower paratracheal node measuring 1.9 cm in least dimension.  Ovoid low-density nodule is present at the right thyroid  lobe.  No convincing acute abnormality of the visualized upper abdomen.  No acute osseous displacement or destructive focal lesion.   IMPRESSION   No convincing central or segmental pulmonary thromboembolism.   Suspected perivascular adenopathy versus right middle lobe airspace consolidation, as well as multiple enlarged mediastinal lymph nodes.  This could be due to acute infectious process but neoplastic/metastatic etiology is not excluded.   Chronic secondary details discussed above.   Electronically signed by: Blake Singh Date:    06/08/2025 Time:    08:56    X-Ray Chest 1 View  Result Date: 6/8/2025  EXAMINATION: Single view chest radiograph. CLINICAL HISTORY: Chest pain, unspecified TECHNIQUE: Single view of the chest. COMPARISON: None. FINDINGS: The lungs are clear without consolidation or effusion.  There is no pneumothorax.  The cardiac silhouette is normal in size.  There is no acute osseous abnormality.   Impression:  No acute cardiopulmonary abnormality.   Electronically signed by: Vijay Ulloa MD Date:    06/08/2025 Time:    08:25

## 2025-06-18 ENCOUNTER — OFFICE VISIT (OUTPATIENT)
Dept: FAMILY MEDICINE | Facility: CLINIC | Age: 65
End: 2025-06-18
Payer: COMMERCIAL

## 2025-06-18 VITALS
DIASTOLIC BLOOD PRESSURE: 70 MMHG | HEART RATE: 52 BPM | SYSTOLIC BLOOD PRESSURE: 133 MMHG | HEIGHT: 68 IN | WEIGHT: 193 LBS | OXYGEN SATURATION: 97 % | BODY MASS INDEX: 29.25 KG/M2 | TEMPERATURE: 98 F

## 2025-06-18 DIAGNOSIS — E78.00 HYPERCHOLESTEROLEMIA: ICD-10-CM

## 2025-06-18 DIAGNOSIS — I21.4 NSTEMI (NON-ST ELEVATED MYOCARDIAL INFARCTION): Primary | ICD-10-CM

## 2025-06-18 DIAGNOSIS — R93.89 ABNORMAL CT OF THE CHEST: ICD-10-CM

## 2025-06-18 DIAGNOSIS — Z86.711 HISTORY OF PULMONARY EMBOLUS (PE): ICD-10-CM

## 2025-06-18 DIAGNOSIS — R05.3 CHRONIC COUGH: ICD-10-CM

## 2025-06-18 DIAGNOSIS — R39.11 URINARY HESITANCY: ICD-10-CM

## 2025-06-18 LAB
BILIRUB SERPL-MCNC: NORMAL MG/DL
BLOOD, POC UA: NORMAL
GLUCOSE UR QL STRIP: NORMAL
KETONES UR QL STRIP: NORMAL
LEUKOCYTE ESTERASE URINE, POC: NORMAL
NITRITE, POC UA: NORMAL
PH, POC UA: 6.5
PROTEIN, POC: NORMAL
SPECIFIC GRAVITY, POC UA: 1.01
UROBILINOGEN, POC UA: 1

## 2025-06-18 RX ORDER — ATORVASTATIN CALCIUM 40 MG/1
40 TABLET, FILM COATED ORAL DAILY
Qty: 90 TABLET | Refills: 3 | Status: SHIPPED | OUTPATIENT
Start: 2025-06-18 | End: 2026-06-18

## 2025-06-18 RX ORDER — GUAIFENESIN 1200 MG/1
1200 TABLET, EXTENDED RELEASE ORAL EVERY 12 HOURS PRN
COMMUNITY

## 2025-06-18 RX ORDER — LISINOPRIL 5 MG/1
5 TABLET ORAL DAILY
COMMUNITY
Start: 2025-06-17

## 2025-06-18 RX ORDER — IBUPROFEN 800 MG/1
800 TABLET, FILM COATED ORAL EVERY 6 HOURS PRN
COMMUNITY
Start: 2025-01-27

## 2025-06-18 NOTE — PROGRESS NOTES
Patient ID: Francis Berman  : 1960    Chief Complaint: Follow-up (Hospital admit for pneumonia)    Allergies: Patient is allergic to promethazine and sulfa (sulfonamide antibiotics).     History of Present Illness:  The patient is a 65 y.o. White male who presents to clinic for evaluation of Follow-up (Hospital admit for pneumonia)     He was discharged from [facility] 06/10/2025 after NSTEMI, type 1 and CHF.  Upon initial presentation, troponin was elevated at 0.48, pro , and D-dimer 0.70. EKG revealed normal sinus rhythm with bundle branch block, left anterior fascicular block, by fascicular block, and age indeterminate lateral/inferior infarct.  Troponin trended upward to 1.19 and heparin drip was initiated.  CTA of the chest was negative for PE but revealed perivascular adenopathy versus right middle lobe consolidation with multiple enlarged mediastinal lymph nodes.  Echocardiogram revealed ejection fraction of 40-45% and grade 1 diastolic dysfunction.  Left heart catheterization revealed 99% stenosis of the 1st diagonal and he underwent PTCA.  He was discharged home with Plavix and Eliquis.  He was also treated for community-acquired pneumonia and completed course of cefdinir at discharge.    He had a f/u with Dr. Segovia yesterday, and his metoprolol succinate was increased to 25 mg daily.  No chest pain since discharge. He continues to note a congested, nonproductive cough but this has improved drastically since completion of cefdinir. He is scheduled to return to work next week.     He reports concern about bladder infection. He notes some urgency and hesitancy but has been drinking very little water and mostly caffeine since leaving the hospital.  Denies abdominal pain, urinary frequency, hematuria, or dysuria.    Social History:  reports that he has been smoking cigarettes. He started smoking about 41 years ago. He has a 60.6 pack-year smoking history. He has never used smokeless  "tobacco. He reports current alcohol use.    Past Medical History:  has a past medical history of Dysarthria, History of heart attack, History of open heart surgery, and Hyperlipidemia.    Current Medications:  Current Outpatient Medications   Medication Instructions    apixaban (ELIQUIS) 5 mg, Oral, 2 times daily    atorvastatin (LIPITOR) 40 mg, Oral, Daily    clopidogreL (PLAVIX) 75 mg, Oral, Daily    guaiFENesin 1,200 mg, Every 12 hours PRN    ibuprofen (ADVIL,MOTRIN) 800 mg, Every 6 hours PRN    lisinopriL (PRINIVIL,ZESTRIL) 5 mg, Daily    metoprolol succinate (TOPROL-XL) 12.5 mg, Oral, Daily    pantoprazole (PROTONIX) 40 mg, Oral, Daily       ROS: See HPI    Visit Vitals  /70 (BP Location: Left arm, Patient Position: Sitting)   Pulse (!) 52   Temp 97.8 °F (36.6 °C) (Temporal)   Ht 5' 7.99" (1.727 m)   Wt 87.5 kg (193 lb)   SpO2 97%   BMI 29.35 kg/m²       Physical Exam  Vitals reviewed.   Constitutional:       General: He is not in acute distress.     Appearance: Normal appearance. He is not ill-appearing.   Cardiovascular:      Rate and Rhythm: Normal rate and regular rhythm.   Pulmonary:      Effort: Pulmonary effort is normal. No respiratory distress.      Breath sounds: Normal breath sounds.   Abdominal:      General: Bowel sounds are normal.      Palpations: Abdomen is soft.      Tenderness: There is no abdominal tenderness.   Musculoskeletal:      Cervical back: Normal range of motion and neck supple. No tenderness.   Lymphadenopathy:      Cervical: No cervical adenopathy.   Skin:     General: Skin is warm and dry.   Neurological:      Mental Status: He is alert and oriented to person, place, and time. Mental status is at baseline.   Psychiatric:         Mood and Affect: Mood normal.         Thought Content: Thought content normal.         Judgment: Judgment normal.          Labs Reviewed:  Chemistry:  Lab Results   Component Value Date     06/09/2025    K 4.1 06/09/2025    BUN 12.2 06/09/2025 "    CREATININE 1.16 06/09/2025    EGFRNORACEVR >60 06/09/2025    CALCIUM 8.9 06/09/2025    ALKPHOS 147 06/09/2025    ALBUMIN 3.2 (L) 06/09/2025    AST 24 06/09/2025    ALT 32 06/09/2025    MG 2.00 06/09/2025    TSH 1.062 06/09/2025        Lab Results   Component Value Date    HGBA1C 5.3 06/09/2025        Hematology:  Lab Results   Component Value Date    WBC 5.06 06/09/2025    RBC 4.48 (L) 06/09/2025    HGB 14.0 06/09/2025    HCT 39.8 (L) 06/09/2025    MCV 88.8 06/09/2025    MCH 31.3 (H) 06/09/2025    MCHC 35.2 06/09/2025    RDW 12.9 06/09/2025     06/09/2025    MPV 8.1 06/09/2025       Lipid Panel:  Lab Results   Component Value Date    CHOL 179 06/09/2025    HDL 33 (L) 06/09/2025    TRIG 164 (H) 06/09/2025    TOTALCHOLEST 5 06/09/2025        Assessment & Plan:  1. NSTEMI (non-ST elevated myocardial infarction)  Assessment & Plan:  Status post PCI  Obtain outpatient visit no with Cardiology   Hospitalization records reviewed      2. Abnormal CT of the chest  Assessment & Plan:  Abnormal CT of the chest with persistent cough and history of smoking  Improved upon completion of cefdinir but cough lingers   If cough persists over the next 4 weeks, obtain CT chest with contrast to ensure improvement/resolution      3. Chronic cough  -     CT Chest With Contrast; Future; Expected date: 07/16/2025  -     Creatinine, serum; Future; Expected date: 06/18/2025    4. Urinary hesitancy  Comments:  Urinalysis negative   Discussed and encouraged to avoid urinary irritants   Increase water intake  Orders:  -     POCT Urinalysis    5. History of pulmonary embolus (PE)  Overview:  On Eliquis    Assessment & Plan:  Refill Eliquis     Orders:  -     apixaban (ELIQUIS) 5 mg Tab; Take 1 tablet (5 mg total) by mouth 2 (two) times daily.  Dispense: 180 tablet; Refill: 3    6. Hypercholesterolemia  Overview:  On atorvastatin    Assessment & Plan:  Continue current medication    Orders:  -     atorvastatin (LIPITOR) 40 MG tablet; Take  1 tablet (40 mg total) by mouth once daily.  Dispense: 90 tablet; Refill: 3         No future appointments.      Follow up in about 4 weeks (around 7/16/2025) for ct results; cough. Call sooner if needed.    CIERA Levy    Lab Frequency Next Occurrence   Ambulatory referral/consult to Cardiology Once 06/21/2024   US Abdomen Complete Once 06/28/2024   CT Chest Lung Screening Low Dose Once 06/28/2024

## 2025-06-18 NOTE — ASSESSMENT & PLAN NOTE
Abnormal CT of the chest with persistent cough and history of smoking  Improved upon completion of cefdinir but cough lingers   If cough persists over the next 4 weeks, obtain CT chest with contrast to ensure improvement/resolution

## 2025-06-19 LAB
OHS QRS DURATION: 126 MS
OHS QTC CALCULATION: 481 MS

## 2025-07-01 ENCOUNTER — HOSPITAL ENCOUNTER (OUTPATIENT)
Dept: RADIOLOGY | Facility: HOSPITAL | Age: 65
Discharge: HOME | End: 2025-07-01
Attending: NURSE PRACTITIONER
Payer: COMMERCIAL

## 2025-07-01 DIAGNOSIS — R05.3 CHRONIC COUGH: ICD-10-CM

## 2025-07-01 PROCEDURE — 71260 CT THORAX DX C+: CPT | Mod: TC

## 2025-07-01 PROCEDURE — 25500020 PHARM REV CODE 255: Performed by: NURSE PRACTITIONER

## 2025-07-01 RX ADMIN — IOHEXOL 100 ML: 300 INJECTION, SOLUTION INTRAVENOUS at 08:07

## 2025-07-17 ENCOUNTER — OFFICE VISIT (OUTPATIENT)
Dept: FAMILY MEDICINE | Facility: CLINIC | Age: 65
End: 2025-07-17
Payer: COMMERCIAL

## 2025-07-17 ENCOUNTER — TELEPHONE (OUTPATIENT)
Dept: FAMILY MEDICINE | Facility: CLINIC | Age: 65
End: 2025-07-17

## 2025-07-17 VITALS
SYSTOLIC BLOOD PRESSURE: 136 MMHG | BODY MASS INDEX: 29.55 KG/M2 | DIASTOLIC BLOOD PRESSURE: 80 MMHG | TEMPERATURE: 97 F | WEIGHT: 195 LBS | HEART RATE: 70 BPM | OXYGEN SATURATION: 97 % | HEIGHT: 68 IN

## 2025-07-17 DIAGNOSIS — J32.9 RHINOSINUSITIS: ICD-10-CM

## 2025-07-17 DIAGNOSIS — R05.3 CHRONIC COUGH: ICD-10-CM

## 2025-07-17 DIAGNOSIS — F17.210 CIGARETTE NICOTINE DEPENDENCE WITHOUT COMPLICATION: ICD-10-CM

## 2025-07-17 DIAGNOSIS — R59.0 MEDIASTINAL LYMPHADENOPATHY: Primary | ICD-10-CM

## 2025-07-17 DIAGNOSIS — I25.10 CORONARY ARTERY DISEASE INVOLVING NATIVE CORONARY ARTERY OF NATIVE HEART WITHOUT ANGINA PECTORIS: ICD-10-CM

## 2025-07-17 PROCEDURE — 3075F SYST BP GE 130 - 139MM HG: CPT | Mod: CPTII,,, | Performed by: NURSE PRACTITIONER

## 2025-07-17 PROCEDURE — 3044F HG A1C LEVEL LT 7.0%: CPT | Mod: CPTII,,, | Performed by: NURSE PRACTITIONER

## 2025-07-17 PROCEDURE — 3079F DIAST BP 80-89 MM HG: CPT | Mod: CPTII,,, | Performed by: NURSE PRACTITIONER

## 2025-07-17 PROCEDURE — 1126F AMNT PAIN NOTED NONE PRSNT: CPT | Mod: CPTII,,, | Performed by: NURSE PRACTITIONER

## 2025-07-17 PROCEDURE — 99214 OFFICE O/P EST MOD 30 MIN: CPT | Mod: ,,, | Performed by: NURSE PRACTITIONER

## 2025-07-17 PROCEDURE — 1160F RVW MEDS BY RX/DR IN RCRD: CPT | Mod: CPTII,,, | Performed by: NURSE PRACTITIONER

## 2025-07-17 PROCEDURE — 1159F MED LIST DOCD IN RCRD: CPT | Mod: CPTII,,, | Performed by: NURSE PRACTITIONER

## 2025-07-17 PROCEDURE — 4010F ACE/ARB THERAPY RXD/TAKEN: CPT | Mod: CPTII,,, | Performed by: NURSE PRACTITIONER

## 2025-07-17 PROCEDURE — 3008F BODY MASS INDEX DOCD: CPT | Mod: CPTII,,, | Performed by: NURSE PRACTITIONER

## 2025-07-17 RX ORDER — FLUTICASONE PROPIONATE 50 MCG
1 SPRAY, SUSPENSION (ML) NASAL DAILY
Qty: 9.9 ML | Refills: 0 | Status: SHIPPED | OUTPATIENT
Start: 2025-07-17

## 2025-07-17 NOTE — PROGRESS NOTES
Patient ID: Francis Berman  : 1960    Chief Complaint: Results (CT)    Allergies: Patient is allergic to promethazine and sulfa (sulfonamide antibiotics).     Subjective :  The patient is a 65 y.o. White male who presents to clinic for follow up on Results (CT)   History of Present Illness    HPI:  Patient recently had a heart attack and pneumonia, leading to hospitalization at [facility] approximately 1 week prior to his last visit on . During his hospital stay, a CTA of his chest was performed to check for pulmonary embolism. While no blood clots were found, the scan revealed enlarged lymph nodes. He reports a persistent cough of unspecified duration.    Following the discovery of enlarged lymph nodes, he completed a course of antibiotics to address a potential infection. His cough persisted even after finishing the antibiotics. A repeat chest CT showed persistent lymphadenopathy, with one lymph node noted to be particularly large, approximately the size of a small plum.    Recently, he spent a few days at his daughter's house, where he was exposed to black mold in the air conditioning system. Since then, he has had significant sinus problems with persistent postnasal drip. He has been taking Benadryl for these symptoms, which has provided some relief without causing drowsiness. He notes unilateral epiphora and rhinorrhea, indicating ongoing allergy-like symptoms.    He denies having pulmonary embolism or any masses seen on his CT.    MEDICATIONS:  Patient is on Benadryl as needed for allergy symptoms.    MEDICAL HISTORY:  Patient has a history of a recent heart attack and pneumonia. Enlarged lymph nodes were detected after his heart attack hospitalization.    IMAGING:  Patient underwent CT Chest scans. Prior to , the results were abnormal, showing enlarged lymph nodes. After antibiotics treatment, a follow-up CT Chest revealed that the enlarged lymph nodes were still present, with one  "lymph node being fairly large, approximately the size of a small plum.    SOCIAL HISTORY:  Smoking: Current smoker, started at age 14, smokes a pack a day      ROS:  ROS as indicated in HPI.           Social History:  reports that he has been smoking cigarettes. He started smoking about 41 years ago. He has a 61.2 pack-year smoking history. He has never used smokeless tobacco. He reports current alcohol use.    Past Medical History:  has a past medical history of Dysarthria, History of heart attack, History of open heart surgery, and Hyperlipidemia.    Current Medications:  Current Outpatient Medications   Medication Instructions    apixaban (ELIQUIS) 5 mg, Oral, 2 times daily    atorvastatin (LIPITOR) 40 mg, Oral, Daily    clopidogreL (PLAVIX) 75 mg, Oral, Daily    fluticasone propionate (FLONASE) 50 mcg, Each Nostril, Daily    guaiFENesin 1,200 mg, Every 12 hours PRN    ibuprofen (ADVIL,MOTRIN) 800 mg, Every 6 hours PRN    lisinopriL (PRINIVIL,ZESTRIL) 5 mg, Daily    metoprolol succinate (TOPROL-XL) 12.5 mg, Oral, Daily    pantoprazole (PROTONIX) 40 mg, Oral, Daily         Visit Vitals  /80 (BP Location: Left arm)   Pulse 70   Temp 97.2 °F (36.2 °C) (Temporal)   Ht 5' 7.99" (1.727 m)   Wt 88.5 kg (195 lb)   SpO2 97%   BMI 29.66 kg/m²       Physical Exam  Vitals reviewed.   Constitutional:       Appearance: Normal appearance.   Eyes:      Conjunctiva/sclera: Conjunctivae normal.   Cardiovascular:      Rate and Rhythm: Normal rate and regular rhythm.      Heart sounds: Normal heart sounds.   Pulmonary:      Effort: Pulmonary effort is normal.      Breath sounds: Normal breath sounds.   Musculoskeletal:      Cervical back: Neck supple.   Skin:     General: Skin is warm and dry.          Labs Reviewed:  Chemistry:  Lab Results   Component Value Date     06/09/2025    K 4.1 06/09/2025    BUN 12.2 06/09/2025    CREATININE 1.53 (H) 07/01/2025    EGFRNORACEVR 50 07/01/2025    CALCIUM 8.9 06/09/2025    ALKPHOS " 147 06/09/2025    ALBUMIN 3.2 (L) 06/09/2025    AST 24 06/09/2025    ALT 32 06/09/2025    MG 2.00 06/09/2025    TSH 1.062 06/09/2025        Lab Results   Component Value Date    HGBA1C 5.3 06/09/2025        Hematology:  Lab Results   Component Value Date    WBC 5.06 06/09/2025    RBC 4.48 (L) 06/09/2025    HGB 14.0 06/09/2025    HCT 39.8 (L) 06/09/2025    MCV 88.8 06/09/2025    MCH 31.3 (H) 06/09/2025    MCHC 35.2 06/09/2025    RDW 12.9 06/09/2025     06/09/2025    MPV 8.1 06/09/2025       Lipid Panel:  Lab Results   Component Value Date    CHOL 179 06/09/2025    HDL 33 (L) 06/09/2025    TRIG 164 (H) 06/09/2025    TOTALCHOLEST 5 06/09/2025        Assessment & Plan:  1. Mediastinal lymphadenopathy    2. Chronic cough    3. Cigarette nicotine dependence without complication  Overview:  Daily smoker for 40 years up to 1.5 packs per day      4. Coronary artery disease involving native coronary artery of native heart without angina pectoris  Overview:  On atorvastatin       5. Rhinosinusitis  -     fluticasone propionate (FLONASE) 50 mcg/actuation nasal spray; 1 spray (50 mcg total) by Each Nostril route once daily.  Dispense: 9.9 mL; Refill: 0         Assessment & Plan    R59.0 Mediastinal lymphadenopathy  F17.210 Cigarette nicotine dependence without complication  I25.10 Coronary artery disease involving native coronary artery of native heart without angina pectoris  J32.9 Rhinosinusitis  R05.3 Chronic cough    IMPRESSION:   Reviewed recent medical history, including heart attack and pneumonia.   Assessed enlarged lymph nodes on previous CT, with one lymphn ode measuring approximately the size of a small plum.   Considered potential implications of persistent lymphadenopathy after antibiotic treatment, including possible early-stage lung cancer.   Determined need for further evaluation of enlarged lymph nodes, particularly the largest one.   Evaluated allergy symptoms related to recent exposure to black mold in  daughter's rental house.    R59.0 MEDIASTINAL LYMPHADENOPATHY:   Explained that enlarged lymph nodes could potentially indicate early-stage lung cancer, though this is not a definitive diagnosis.   Referred to specialized lung clinic in [place] for further evaluation.   Patient to follow up when contacted by the lung program for appointment scheduling.    J32.9 RHINOSINUSITIS:   Discussed health risks associated with black mold exposure, including sinus problems and allergies.   Started Flonase nasal spray for sinus symptoms and continued Benadryl as needed for allergy symptoms.           Follow up for 3 mo f/u, Wellness, Fasting Labs prior WITH Luzmaria. Call sooner if needed.    Sandy Grubbs, RUFINOP-C             This note was generated with the assistance of ambient listening technology. Verbal consent was obtained by the patient and accompanying visitor(s) for the recording of patient appointment to facilitate this note. I attest to having reviewed and edited the generated note for accuracy, though some syntax or spelling errors may persist. Please contact the author of this note for any clarification.

## 2025-07-17 NOTE — TELEPHONE ENCOUNTER
Hey, this patient was recently discovered to have a fairly large mediastinal lymph node--3cm. He needs to be referred for further evaluation; can you help with this?

## 2025-07-18 NOTE — TELEPHONE ENCOUNTER
Yes Ma'am, please place the referral.  He is not currently established with any pulmonologist. Thanks!

## 2025-07-22 DIAGNOSIS — R59.0 MEDIASTINAL LYMPHADENOPATHY: Primary | ICD-10-CM

## 2025-07-31 ENCOUNTER — ANESTHESIA EVENT (OUTPATIENT)
Dept: ENDOSCOPY | Facility: HOSPITAL | Age: 65
End: 2025-07-31
Payer: COMMERCIAL

## 2025-08-04 RX ORDER — DIPHENHYDRAMINE HCL 25 MG
25 CAPSULE ORAL EVERY 6 HOURS PRN
COMMUNITY

## 2025-08-07 ENCOUNTER — HOSPITAL ENCOUNTER (OUTPATIENT)
Facility: HOSPITAL | Age: 65
Discharge: HOME | End: 2025-08-07
Attending: HOSPITALIST | Admitting: INTERNAL MEDICINE
Payer: COMMERCIAL

## 2025-08-07 ENCOUNTER — ANESTHESIA (OUTPATIENT)
Dept: ENDOSCOPY | Facility: HOSPITAL | Age: 65
End: 2025-08-07
Payer: COMMERCIAL

## 2025-08-07 PROCEDURE — 37000009 HC ANESTHESIA EA ADD 15 MINS: Performed by: INTERNAL MEDICINE

## 2025-08-07 PROCEDURE — 25000003 PHARM REV CODE 250

## 2025-08-07 PROCEDURE — 99900035 HC TECH TIME PER 15 MIN (STAT)

## 2025-08-07 PROCEDURE — 99900025 HC BRONCHOSCOPY-ASST (STAT)

## 2025-08-07 PROCEDURE — 63600175 PHARM REV CODE 636 W HCPCS

## 2025-08-07 PROCEDURE — 37000008 HC ANESTHESIA 1ST 15 MINUTES: Performed by: INTERNAL MEDICINE

## 2025-08-07 PROCEDURE — 31652 BRONCH EBUS SAMPLNG 1/2 NODE: CPT | Performed by: INTERNAL MEDICINE

## 2025-08-07 PROCEDURE — 27201423 OPTIME MED/SURG SUP & DEVICES STERILE SUPPLY: Performed by: INTERNAL MEDICINE

## 2025-08-07 PROCEDURE — 25000003 PHARM REV CODE 250: Performed by: HOSPITALIST

## 2025-08-07 PROCEDURE — C1726 CATH, BAL DIL, NON-VASCULAR: HCPCS | Performed by: INTERNAL MEDICINE

## 2025-08-07 RX ORDER — DEXAMETHASONE SODIUM PHOSPHATE 4 MG/ML
INJECTION, SOLUTION INTRA-ARTICULAR; INTRALESIONAL; INTRAMUSCULAR; INTRAVENOUS; SOFT TISSUE
Status: DISCONTINUED | OUTPATIENT
Start: 2025-08-07 | End: 2025-08-07

## 2025-08-07 RX ORDER — PROPOFOL 10 MG/ML
INJECTION, EMULSION INTRAVENOUS
Status: DISCONTINUED | OUTPATIENT
Start: 2025-08-07 | End: 2025-08-07

## 2025-08-07 RX ORDER — ETOMIDATE 2 MG/ML
INJECTION INTRAVENOUS
Status: DISCONTINUED | OUTPATIENT
Start: 2025-08-07 | End: 2025-08-07

## 2025-08-07 RX ORDER — LIDOCAINE HYDROCHLORIDE 40 MG/ML
4 SOLUTION TOPICAL
Status: DISCONTINUED | OUTPATIENT
Start: 2025-08-07 | End: 2025-08-07 | Stop reason: HOSPADM

## 2025-08-07 RX ORDER — LIDOCAINE HYDROCHLORIDE 20 MG/ML
INJECTION INTRAVENOUS
Status: DISCONTINUED | OUTPATIENT
Start: 2025-08-07 | End: 2025-08-07

## 2025-08-07 RX ORDER — FENTANYL CITRATE 50 UG/ML
INJECTION, SOLUTION INTRAMUSCULAR; INTRAVENOUS
Status: DISCONTINUED | OUTPATIENT
Start: 2025-08-07 | End: 2025-08-07

## 2025-08-07 RX ADMIN — PROPOFOL 50 MG: 10 INJECTION, EMULSION INTRAVENOUS at 10:08

## 2025-08-07 RX ADMIN — SODIUM CHLORIDE, SODIUM GLUCONATE, SODIUM ACETATE, POTASSIUM CHLORIDE AND MAGNESIUM CHLORIDE: 526; 502; 368; 37; 30 INJECTION, SOLUTION INTRAVENOUS at 10:08

## 2025-08-07 RX ADMIN — ETOMIDATE 6 MG: 2 INJECTION INTRAVENOUS at 10:08

## 2025-08-07 RX ADMIN — SODIUM CHLORIDE: 9 INJECTION, SOLUTION INTRAVENOUS at 09:08

## 2025-08-07 RX ADMIN — DEXAMETHASONE SODIUM PHOSPHATE 8 MG: 4 INJECTION, SOLUTION INTRA-ARTICULAR; INTRALESIONAL; INTRAMUSCULAR; INTRAVENOUS; SOFT TISSUE at 10:08

## 2025-08-07 RX ADMIN — LIDOCAINE HYDROCHLORIDE 4 ML: 40 SOLUTION TOPICAL at 09:08

## 2025-08-07 RX ADMIN — LIDOCAINE HYDROCHLORIDE 40 MG: 20 INJECTION INTRAVENOUS at 10:08

## 2025-08-07 RX ADMIN — FENTANYL CITRATE 50 MCG: 50 INJECTION, SOLUTION INTRAMUSCULAR; INTRAVENOUS at 10:08

## 2025-08-07 RX ADMIN — PROPOFOL 100 MG: 10 INJECTION, EMULSION INTRAVENOUS at 10:08

## 2025-08-07 RX ADMIN — ETOMIDATE 10 MG: 2 INJECTION INTRAVENOUS at 10:08

## 2025-08-07 RX ADMIN — PROPOFOL 100 MCG/KG/MIN: 10 INJECTION, EMULSION INTRAVENOUS at 10:08

## 2025-08-08 VITALS
SYSTOLIC BLOOD PRESSURE: 130 MMHG | OXYGEN SATURATION: 94 % | DIASTOLIC BLOOD PRESSURE: 83 MMHG | HEART RATE: 70 BPM | RESPIRATION RATE: 17 BRPM | HEIGHT: 69 IN | BODY MASS INDEX: 27.56 KG/M2 | TEMPERATURE: 98 F | WEIGHT: 186.06 LBS

## 2025-08-13 LAB — PSYCHE PATHOLOGY RESULT: NORMAL

## 2025-08-21 ENCOUNTER — LAB VISIT (OUTPATIENT)
Dept: LAB | Facility: HOSPITAL | Age: 65
End: 2025-08-21
Attending: STUDENT IN AN ORGANIZED HEALTH CARE EDUCATION/TRAINING PROGRAM
Payer: COMMERCIAL

## 2025-08-21 ENCOUNTER — OFFICE VISIT (OUTPATIENT)
Facility: CLINIC | Age: 65
End: 2025-08-21
Payer: COMMERCIAL

## 2025-08-21 VITALS
SYSTOLIC BLOOD PRESSURE: 122 MMHG | HEART RATE: 70 BPM | TEMPERATURE: 97 F | RESPIRATION RATE: 16 BRPM | BODY MASS INDEX: 29.05 KG/M2 | HEIGHT: 69 IN | DIASTOLIC BLOOD PRESSURE: 77 MMHG | OXYGEN SATURATION: 98 % | WEIGHT: 196.13 LBS

## 2025-08-21 DIAGNOSIS — C34.90 MALIGNANT NEOPLASM OF UNSPECIFIED PART OF UNSPECIFIED BRONCHUS OR LUNG: Primary | ICD-10-CM

## 2025-08-21 DIAGNOSIS — C34.80 SMALL CELL CARCINOMA OF OVERLAPPING SITES OF LUNG, UNSPECIFIED LATERALITY: ICD-10-CM

## 2025-08-21 DIAGNOSIS — C34.90 MALIGNANT NEOPLASM OF UNSPECIFIED PART OF UNSPECIFIED BRONCHUS OR LUNG: ICD-10-CM

## 2025-08-21 DIAGNOSIS — Z45.2 ADMISSION FOR FITTING OF PORT-A-CATH: ICD-10-CM

## 2025-08-21 LAB
ALBUMIN SERPL-MCNC: 3.6 G/DL (ref 3.4–4.8)
ALBUMIN/GLOB SERPL: 1.1 RATIO (ref 1.1–2)
ALP SERPL-CCNC: 156 UNIT/L (ref 40–150)
ALT SERPL-CCNC: 26 UNIT/L (ref 0–55)
ANION GAP SERPL CALC-SCNC: 7 MEQ/L
AST SERPL-CCNC: 25 UNIT/L (ref 11–45)
BASOPHILS # BLD AUTO: 0.01 X10(3)/MCL
BASOPHILS NFR BLD AUTO: 0.2 %
BILIRUB SERPL-MCNC: 0.7 MG/DL
BUN SERPL-MCNC: 16 MG/DL (ref 8.4–25.7)
CALCIUM SERPL-MCNC: 8.8 MG/DL (ref 8.8–10)
CHLORIDE SERPL-SCNC: 108 MMOL/L (ref 98–107)
CO2 SERPL-SCNC: 23 MMOL/L (ref 23–31)
CREAT SERPL-MCNC: 1.05 MG/DL (ref 0.72–1.25)
CREAT/UREA NIT SERPL: 15
EOSINOPHIL # BLD AUTO: 0.18 X10(3)/MCL (ref 0–0.9)
EOSINOPHIL NFR BLD AUTO: 3.5 %
ERYTHROCYTE [DISTWIDTH] IN BLOOD BY AUTOMATED COUNT: 13.2 % (ref 11.5–17)
GFR SERPLBLD CREATININE-BSD FMLA CKD-EPI: >60 ML/MIN/1.73/M2
GLOBULIN SER-MCNC: 3.3 GM/DL (ref 2.4–3.5)
GLUCOSE SERPL-MCNC: 169 MG/DL (ref 82–115)
HCT VFR BLD AUTO: 40.2 % (ref 42–52)
HGB BLD-MCNC: 14 G/DL (ref 14–18)
IMM GRANULOCYTES # BLD AUTO: 0.03 X10(3)/MCL (ref 0–0.04)
IMM GRANULOCYTES NFR BLD AUTO: 0.6 %
LYMPHOCYTES # BLD AUTO: 1.11 X10(3)/MCL (ref 0.6–4.6)
LYMPHOCYTES NFR BLD AUTO: 21.6 %
MCH RBC QN AUTO: 31.3 PG (ref 27–31)
MCHC RBC AUTO-ENTMCNC: 34.8 G/DL (ref 33–36)
MCV RBC AUTO: 89.9 FL (ref 80–94)
MONOCYTES # BLD AUTO: 0.24 X10(3)/MCL (ref 0.1–1.3)
MONOCYTES NFR BLD AUTO: 4.7 %
NEUTROPHILS # BLD AUTO: 3.58 X10(3)/MCL (ref 2.1–9.2)
NEUTROPHILS NFR BLD AUTO: 69.4 %
PLATELET # BLD AUTO: 125 X10(3)/MCL (ref 130–400)
PMV BLD AUTO: 9 FL (ref 7.4–10.4)
POTASSIUM SERPL-SCNC: 4 MMOL/L (ref 3.5–5.1)
PROT SERPL-MCNC: 6.9 GM/DL (ref 5.8–7.6)
RBC # BLD AUTO: 4.47 X10(6)/MCL (ref 4.7–6.1)
SODIUM SERPL-SCNC: 138 MMOL/L (ref 136–145)
TSH SERPL-ACNC: 0.8 UIU/ML (ref 0.35–4.94)
WBC # BLD AUTO: 5.15 X10(3)/MCL (ref 4.5–11.5)

## 2025-08-21 PROCEDURE — 99205 OFFICE O/P NEW HI 60 MIN: CPT | Mod: S$GLB,,, | Performed by: STUDENT IN AN ORGANIZED HEALTH CARE EDUCATION/TRAINING PROGRAM

## 2025-08-21 PROCEDURE — 1126F AMNT PAIN NOTED NONE PRSNT: CPT | Mod: CPTII,S$GLB,, | Performed by: STUDENT IN AN ORGANIZED HEALTH CARE EDUCATION/TRAINING PROGRAM

## 2025-08-21 PROCEDURE — G2211 COMPLEX E/M VISIT ADD ON: HCPCS | Mod: S$GLB,,, | Performed by: STUDENT IN AN ORGANIZED HEALTH CARE EDUCATION/TRAINING PROGRAM

## 2025-08-21 PROCEDURE — 1160F RVW MEDS BY RX/DR IN RCRD: CPT | Mod: CPTII,S$GLB,, | Performed by: STUDENT IN AN ORGANIZED HEALTH CARE EDUCATION/TRAINING PROGRAM

## 2025-08-21 PROCEDURE — 4010F ACE/ARB THERAPY RXD/TAKEN: CPT | Mod: CPTII,S$GLB,, | Performed by: STUDENT IN AN ORGANIZED HEALTH CARE EDUCATION/TRAINING PROGRAM

## 2025-08-21 PROCEDURE — 84443 ASSAY THYROID STIM HORMONE: CPT

## 2025-08-21 PROCEDURE — 3074F SYST BP LT 130 MM HG: CPT | Mod: CPTII,S$GLB,, | Performed by: STUDENT IN AN ORGANIZED HEALTH CARE EDUCATION/TRAINING PROGRAM

## 2025-08-21 PROCEDURE — 1159F MED LIST DOCD IN RCRD: CPT | Mod: CPTII,S$GLB,, | Performed by: STUDENT IN AN ORGANIZED HEALTH CARE EDUCATION/TRAINING PROGRAM

## 2025-08-21 PROCEDURE — 3008F BODY MASS INDEX DOCD: CPT | Mod: CPTII,S$GLB,, | Performed by: STUDENT IN AN ORGANIZED HEALTH CARE EDUCATION/TRAINING PROGRAM

## 2025-08-21 PROCEDURE — 3078F DIAST BP <80 MM HG: CPT | Mod: CPTII,S$GLB,, | Performed by: STUDENT IN AN ORGANIZED HEALTH CARE EDUCATION/TRAINING PROGRAM

## 2025-08-21 PROCEDURE — 3044F HG A1C LEVEL LT 7.0%: CPT | Mod: CPTII,S$GLB,, | Performed by: STUDENT IN AN ORGANIZED HEALTH CARE EDUCATION/TRAINING PROGRAM

## 2025-08-21 PROCEDURE — 80053 COMPREHEN METABOLIC PANEL: CPT

## 2025-08-21 PROCEDURE — 99999 PR PBB SHADOW E&M-EST. PATIENT-LVL V: CPT | Mod: PBBFAC,,, | Performed by: STUDENT IN AN ORGANIZED HEALTH CARE EDUCATION/TRAINING PROGRAM

## 2025-08-21 PROCEDURE — 1101F PT FALLS ASSESS-DOCD LE1/YR: CPT | Mod: CPTII,S$GLB,, | Performed by: STUDENT IN AN ORGANIZED HEALTH CARE EDUCATION/TRAINING PROGRAM

## 2025-08-21 PROCEDURE — 3288F FALL RISK ASSESSMENT DOCD: CPT | Mod: CPTII,S$GLB,, | Performed by: STUDENT IN AN ORGANIZED HEALTH CARE EDUCATION/TRAINING PROGRAM

## 2025-08-21 PROCEDURE — 85025 COMPLETE CBC W/AUTO DIFF WBC: CPT

## 2025-08-21 PROCEDURE — 36415 COLL VENOUS BLD VENIPUNCTURE: CPT

## 2025-08-25 ENCOUNTER — HOSPITAL ENCOUNTER (OUTPATIENT)
Dept: RADIOLOGY | Facility: HOSPITAL | Age: 65
Discharge: HOME | End: 2025-08-25
Attending: SURGERY
Payer: COMMERCIAL

## 2025-08-25 ENCOUNTER — CLINICAL SUPPORT (OUTPATIENT)
Dept: RESPIRATORY THERAPY | Facility: HOSPITAL | Age: 65
End: 2025-08-25
Attending: SURGERY
Payer: COMMERCIAL

## 2025-08-25 DIAGNOSIS — C34.11 SMALL CELL LUNG CANCER, RIGHT UPPER LOBE: ICD-10-CM

## 2025-08-25 DIAGNOSIS — C34.90 MALIGNANT NEOPLASM OF UNSPECIFIED PART OF UNSPECIFIED BRONCHUS OR LUNG: Primary | ICD-10-CM

## 2025-08-25 DIAGNOSIS — C34.00 SMALL CELL CARCINOMA OF HILUM OF LUNG, UNSPECIFIED LATERALITY: ICD-10-CM

## 2025-08-25 DIAGNOSIS — C34.00 SMALL CELL CARCINOMA OF HILUM OF LUNG, UNSPECIFIED LATERALITY: Primary | ICD-10-CM

## 2025-08-25 DIAGNOSIS — C34.00 MALIGNANT NEOPLASM OF MAIN BRONCHUS: ICD-10-CM

## 2025-08-25 LAB
OHS QRS DURATION: 134 MS
OHS QTC CALCULATION: 469 MS

## 2025-08-25 PROCEDURE — 71046 X-RAY EXAM CHEST 2 VIEWS: CPT | Mod: TC

## 2025-08-25 PROCEDURE — 93005 ELECTROCARDIOGRAM TRACING: CPT

## 2025-08-25 PROCEDURE — 93010 ELECTROCARDIOGRAM REPORT: CPT | Mod: ,,, | Performed by: INTERNAL MEDICINE

## 2025-08-25 RX ORDER — MUPIROCIN 20 MG/G
OINTMENT TOPICAL
OUTPATIENT
Start: 2025-08-25

## 2025-08-25 RX ORDER — SODIUM CHLORIDE 9 MG/ML
INJECTION, SOLUTION INTRAVENOUS CONTINUOUS
OUTPATIENT
Start: 2025-08-25

## 2025-09-02 ENCOUNTER — HOSPITAL ENCOUNTER (OUTPATIENT)
Dept: RADIOLOGY | Facility: HOSPITAL | Age: 65
Discharge: HOME | End: 2025-09-02
Attending: STUDENT IN AN ORGANIZED HEALTH CARE EDUCATION/TRAINING PROGRAM
Payer: COMMERCIAL

## 2025-09-02 DIAGNOSIS — C34.90 MALIGNANT NEOPLASM OF UNSPECIFIED PART OF UNSPECIFIED BRONCHUS OR LUNG: ICD-10-CM

## 2025-09-02 PROCEDURE — A9577 INJ MULTIHANCE: HCPCS | Performed by: STUDENT IN AN ORGANIZED HEALTH CARE EDUCATION/TRAINING PROGRAM

## 2025-09-02 PROCEDURE — 25500020 PHARM REV CODE 255: Performed by: STUDENT IN AN ORGANIZED HEALTH CARE EDUCATION/TRAINING PROGRAM

## 2025-09-02 PROCEDURE — 70553 MRI BRAIN STEM W/O & W/DYE: CPT | Mod: TC

## 2025-09-02 RX ADMIN — GADOBENATE DIMEGLUMINE 20 ML: 529 INJECTION, SOLUTION INTRAVENOUS at 12:09

## 2025-09-04 ENCOUNTER — HOSPITAL ENCOUNTER (OUTPATIENT)
Dept: RADIOLOGY | Facility: HOSPITAL | Age: 65
Discharge: HOME | End: 2025-09-04
Attending: STUDENT IN AN ORGANIZED HEALTH CARE EDUCATION/TRAINING PROGRAM
Payer: COMMERCIAL

## 2025-09-04 DIAGNOSIS — C34.11 SMALL CELL LUNG CANCER, RIGHT UPPER LOBE: ICD-10-CM

## 2025-09-04 PROCEDURE — A9552 F18 FDG: HCPCS | Performed by: STUDENT IN AN ORGANIZED HEALTH CARE EDUCATION/TRAINING PROGRAM

## 2025-09-04 PROCEDURE — 78815 PET IMAGE W/CT SKULL-THIGH: CPT | Mod: TC,PI

## 2025-09-04 RX ORDER — FLUDEOXYGLUCOSE F18 500 MCI/ML
10 INJECTION INTRAVENOUS
Status: COMPLETED | OUTPATIENT
Start: 2025-09-04 | End: 2025-09-04

## 2025-09-04 RX ADMIN — FLUDEOXYGLUCOSE F-18 11 MILLICURIE: 500 INJECTION INTRAVENOUS at 08:09

## 2025-09-05 PROBLEM — C34.00: Status: ACTIVE | Noted: 2025-08-21

## (undated) DEVICE — Device

## (undated) DEVICE — CATH EMERGE MR 15 X 2.00

## (undated) DEVICE — GLOVE 7.5 PROTEXIS PI MICRO

## (undated) DEVICE — GUIDE LAUNCHER 6FR EBU 3.5

## (undated) DEVICE — COVER PROBE US 5.5X58L NON LTX

## (undated) DEVICE — KIT MANIFOLD LOW PRESS TUBING

## (undated) DEVICE — KIT HAND CONTROL HIGH PRESSUR

## (undated) DEVICE — SHEATH INTRODUCER 5FR 10CM

## (undated) DEVICE — GUIDEWIRE INQWIRE SE 3MM JTIP

## (undated) DEVICE — SHEATH INTRODUCER 6FR 11CM

## (undated) DEVICE — CANNULA DUAL CO2/O2 NASAL 7FT

## (undated) DEVICE — PACK OR CLEAN UP COMBO SIZE 2

## (undated) DEVICE — GUIDEWIRE RUNTHROUGH NS 180CM

## (undated) DEVICE — CONTRAST ISOVUE 370 500ML MULT

## (undated) DEVICE — PAD DEFIB CADENCE ADULT R2

## (undated) DEVICE — CATH BRONCHOSCOPE F/BF

## (undated) DEVICE — VALVE HMSTS GRDN II 8FR GW INS

## (undated) DEVICE — CATH IMPULSE IM CRV 100CM 5FR